# Patient Record
Sex: FEMALE | Race: WHITE | Employment: FULL TIME | URBAN - METROPOLITAN AREA
[De-identification: names, ages, dates, MRNs, and addresses within clinical notes are randomized per-mention and may not be internally consistent; named-entity substitution may affect disease eponyms.]

---

## 2017-01-05 ENCOUNTER — OFFICE VISIT (OUTPATIENT)
Dept: OBGYN | Facility: OTHER | Age: 22
End: 2017-01-05
Payer: COMMERCIAL

## 2017-01-05 VITALS
SYSTOLIC BLOOD PRESSURE: 118 MMHG | WEIGHT: 141.25 LBS | BODY MASS INDEX: 23.25 KG/M2 | DIASTOLIC BLOOD PRESSURE: 68 MMHG | HEART RATE: 68 BPM

## 2017-01-05 DIAGNOSIS — R10.31 ABDOMINAL PAIN, RIGHT LOWER QUADRANT: Primary | ICD-10-CM

## 2017-01-05 PROCEDURE — 99203 OFFICE O/P NEW LOW 30 MIN: CPT | Performed by: ADVANCED PRACTICE MIDWIFE

## 2017-01-05 NOTE — PROGRESS NOTES
Mayi Aleman is a 21 year old who presents to the clinic for evaluation/ follow up of suspected ovarian cyst.   Was seen in the ED at  on 12/28 and diagnosed with a probable right ovarian cyst.         Histories reviewed and updated  Past Medical History   Diagnosis Date     Exercise-induced asthma 3/4/2011     Past Surgical History   Procedure Laterality Date     Appendectomy  12/08/06     Laparoscopic      Colonoscopy  1/28/2013     Procedure: COLONOSCOPY;  Colonoscopy;  Surgeon: Jamison Christianson MD;  Location: [facility]     Biopsy of skin lesion       Social History     Social History     Marital Status: Single     Spouse Name: N/A     Number of Children: N/A     Years of Education: N/A     Occupational History     Not on file.     Social History Main Topics     Smoking status: Never Smoker      Smokeless tobacco: Never Used      Comment: Mom smokes outside     Alcohol Use: No     Drug Use: No     Sexual Activity:     Partners: Male     Birth Control/ Protection: Pill     Other Topics Concern     Parent/Sibling W/ Cabg, Mi Or Angioplasty Before 65f 55m? No     Social History Narrative       Pt is very sad looking, I wonder if this is related to mom who during the whole visit was very gruff and had a     passive aggressive nature.  Mom works as a nurse manager for [email or web address] in [facility].     Family History   Problem Relation Age of Onset     Hypertension Maternal Grandmother      Lipids Maternal Grandmother      CANCER Paternal Grandfather      skin     Asthma Mother      Asthma Maternal Grandmother                   CONSTITUTIONAL: Healthy, alert, in no apparent distress.  GI: NEGATIVE  : rates pain now as 1/10 and notes it primarily after working a shift at [employer].   About a week before the episode of pain noted a blood clot in her urine with out repeating since.   Was just past her pill free week of her extended cycle pills when she was in the ED      EXAM:  /68 mmHg  Pulse 68  Wt 141 lb 4 oz  (64.071 kg)    GI: NEGATIVE/ mild discomfort with palpation of the right lower quad. Otherwise abd soft without masses.   Ultrasound from [facility] from 12/28 normal with the exception of a small amount of free fluid in the pelvis.     ASSESSMENT/PLAN:    (R10.31) Abdominal pain, right lower quadrant  (primary encounter diagnosis)  Comment:   Plan:     Probable ruptured ovarian cyst.    Would recommend continued MAURILIO use.  Could shorted the pill free week from 7 to 4 days.   Could also consider continuous MAURILIO use.   Given the blood clot in he urine couldn't completely exclude renal calculus but there wasn't any blood in her urine on the day of her exam.       Time spent wsa 30 min excluding exam.

## 2017-01-05 NOTE — NURSING NOTE
"Chief Complaint   Patient presents with     Cyst     right side ovarian cyst rupture       Initial /68 mmHg  Pulse 68  Wt 141 lb 4 oz (64.071 kg) Estimated body mass index is 23.25 kg/(m^2) as calculated from the following:    Height as of 3/15/16: 5' 5.35\" (1.66 m).    Weight as of this encounter: 141 lb 4 oz (64.071 kg).  BP completed using cuff size: regular  Margaret Mast CMA      "

## 2017-02-10 ENCOUNTER — OFFICE VISIT (OUTPATIENT)
Dept: FAMILY MEDICINE | Facility: OTHER | Age: 22
End: 2017-02-10
Payer: COMMERCIAL

## 2017-02-10 VITALS
HEIGHT: 65 IN | SYSTOLIC BLOOD PRESSURE: 122 MMHG | DIASTOLIC BLOOD PRESSURE: 78 MMHG | TEMPERATURE: 98.3 F | HEART RATE: 100 BPM | BODY MASS INDEX: 21.83 KG/M2 | RESPIRATION RATE: 12 BRPM | WEIGHT: 131 LBS

## 2017-02-10 DIAGNOSIS — E86.0 DEHYDRATION: ICD-10-CM

## 2017-02-10 DIAGNOSIS — K52.9 GASTROENTERITIS: Primary | ICD-10-CM

## 2017-02-10 PROCEDURE — 99214 OFFICE O/P EST MOD 30 MIN: CPT | Performed by: FAMILY MEDICINE

## 2017-02-10 RX ORDER — ONDANSETRON 4 MG/1
4-8 TABLET, ORALLY DISINTEGRATING ORAL EVERY 8 HOURS PRN
Qty: 20 TABLET | Refills: 1 | COMMUNITY
Start: 2017-02-10 | End: 2019-07-25

## 2017-02-10 ASSESSMENT — PAIN SCALES - GENERAL: PAINLEVEL: NO PAIN (0)

## 2017-02-10 NOTE — PROGRESS NOTES
SUBJECTIVE:                                                    Mayi Aleman is a 21 year old female who presents to clinic today for the following health issues:      Acute Illness   Acute illness concerns: vomiting, diarrhea  Onset: x 3 days     Fever: YES    Chills/Sweats: YES- yesterday    Headache (location?): no    Sinus Pressure:no    Conjunctivitis:  no    Ear Pain: no    Rhinorrhea: no    Congestion: no    Sore Throat: no     Cough: no    Wheeze: no    Decreased Appetite: YES- can't keep anything down    Nausea: YES    Vomiting: YES    Diarrhea:  YES    Dysuria/Freq.: no    Fatigue/Achiness: YES    Sick/Strep Exposure: no     Therapies Tried and outcome: zofran this morning, tylenol  Not able to keep any water down, hasn't eaten in like 3 days besides some gatorade and a couple popsicles   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  Has had nausea and vomiting since then as well.  Thought it was food poisoning.  Started shortly after eating.  Did have a fever.  Hasn't been able to keep much down.  Now just having liquid coming out rectally.  Had at least 4 episodes of emesis in the last 24 hours.  Her diarrhea also smells terrible.    Took some imodium yesterday, noted some temporary improvement.    Denies travel.  No recent antibiotics.  Admitted a patient to the hospital on Monday that had similar symptoms.      Pt has taken Advil and tylenol for this.  Did try some Zofran this morning.       Problem list and histories reviewed & adjusted, as indicated.  Additional history: as documented    Current Outpatient Prescriptions   Medication Sig Dispense Refill     levonorgestrel-ethinyl estradiol (SEASONALE) 0.15-0.03 MG per tablet Take 1 tablet by mouth daily 91 tablet 3     Spacer/Aero-Holding Chambers GRISELDA 1 Device daily. 1 Device prn     albuterol 90 MCG/ACT inhaler Inhale 2 puffs into the lungs  "every 6 hours as needed for shortness of breath / dyspnea. 1 Inhaler 12     No lab results found.   BP Readings from Last 3 Encounters:   02/10/17 122/78   01/05/17 118/68   10/06/16 129/72    Wt Readings from Last 3 Encounters:   02/10/17 131 lb (59.421 kg)   01/05/17 141 lb 4 oz (64.071 kg)   03/15/16 137 lb (62.143 kg)                 ROS:  Constitutional, HEENT, cardiovascular, pulmonary, gi and gu systems are negative, except as otherwise noted.  As above.    OBJECTIVE:                                                    /78 mmHg  Pulse 100  Temp(Src) 98.3  F (36.8  C) (Temporal)  Resp 12  Ht 5' 4.5\" (1.638 m)  Wt 131 lb (59.421 kg)  BMI 22.15 kg/m2  LMP 12/30/2016 (Approximate)  Body mass index is 22.15 kg/(m^2).  GENERAL: healthy, alert and no distress  EYES: Eyes grossly normal to inspection, PERRL and conjunctivae and sclerae normal  HENT: ear canals and TM's normal, nose and mouth without ulcers or lesions  NECK: no adenopathy, no asymmetry, masses, or scars and thyroid normal to palpation  RESP: lungs clear to auscultation - no rales, rhonchi or wheezes  CV: regular rate and rhythm, normal S1 S2, no S3 or S4, no murmur, click or rub, no peripheral edema and peripheral pulses strong  ABDOMEN: bloated, mildly uncomfortable.  No masses.  MS: no gross musculoskeletal defects noted, no edema  SKIN: cap refill under 3 seconds.  Cool extremities.    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    Electronically signed out by:    Zechariah Nolan M.D., [facility]    CLINICAL HISTORY:  The patient is a 21-year-old female.    GROSS:  The specimen is received in formalin with proper patient identification,  labeled \"right posterior shoulder\".  The specimen consists of unoriented  gray-tan ellipsoid skin excision that measures 1.5 x 0.8 cm excised to a  depth of 0.6 cm.  Identified on the skin surface is a pale gray sl ightly  indurated macule that measures 0.6 cm in diameter.  Margin is inked and  the specimen serially sectioned.  Additionally received within the  specimen container is a lobulated gray-yellow soft tissue fragment that  measures 0.8 x 0.5 x 0.2 cm.  This fragment is inked blue and is  bisected.  Entirely submitted as follows: 1-polar ends, 2 and  3-remaining serial sections. (Dictated by: Fernando Urrutia 10/6/2016 02:45  PM)    MICROSCOPIC:  The specimen exhibits irregular epidermal hyperplasia overlying dermal  fibrosis and dense perivascular lymphocytic inflammation consistent with  scar from the prior surgical procedure.  Overlying and adjacent to the  scar, a residual junctional melanocytic proliferation is present, with  both nests and single cells, with moderate cytologic atypia and  architectural disorder but no notable pagetoid scatter or junctional  confluence.  This residual nevus is completely excised.    CPT Codes:  A: 23463-GR3.P, 16282-MD8.T    TESTING LAB LOCATION:  [facility], [address]   [address] [phone]    COLLECTION SITE:  Client: [facility]  Location: [facility] (B)          ASSESSMENT/PLAN:                                                        ICD-10-CM    1. Gastroenteritis K52.9 " Enteric Bacteria and Virus Panel by MESSI Stool     ondansetron (ZOFRAN ODT) 4 MG ODT tab   2. Dehydration E86.0      Pt likely has viral gastroenteritis, but would expect it to be improving by this point.  Discussed IV vs PO rehydration.  Pt wanted to pursue IV fluids.  These were given and pt reported improvement.  See RN documentation.  Will have her continue Zofran until no longer having nausea.  Will obtain stool studies to look for other causes, but history and exam don't indicate any severe etiologies at this time.  Follow up if not improving or if other concerning symptoms occur.          Patient Instructions   Thank you for visiting [facility]    Take the Zofran regularly to allow you to stay hydrated.    Continue tylenol or ibuprofen for comfort and fever control.    Get stool sample in unless you don't poop, which is OK too.    We'll let you know your lab results as soon as we can.     Please see me in 1-2  weeks for follow up if not improving.       If you had imaging scheduled please refer to your radiology prep sheet.    Appointment    Date_______________     Time_____________    Day:   M TU W TH F    With____________________________    Location_________________________    If you need medication refills, please contact your pharmacy 3 days before your prescriptions runs out. If you are out of refills, your pharmacy will contact contact the clinic.    Contact us or return if questions or concerns.     -Your Care Team:  MD Elaine Almazan PA-C Folake Falaki, MD Anoshirvan Mazhari, MD Kelly White, CNP    General information about your clinic      Clinic hours:     Lab hours:  Phone [phone]  Monday 7:30 am-7 pm    Monday 8:30 am-6:30 pm  Tuesday-Friday 7:30 am-5 pm   Tuesday-Friday 8:30 am-4:30 pm    Pharmacy hours:  Phone [phone]  Monday 8:30 am-7pm  Tuesday-Friday 8:30am-6 pm                                       Mychart assistance  [phone]        We would like to hear from you, how was your visit today?    Ally Molina  Patient Information Supervisor   Patient Care Supervisor  [facility], and [facility], [place], and [facility]  [phone]           Zechariah Canada MD, MD  [facility]

## 2017-02-10 NOTE — PROGRESS NOTES
Mayi Aleman is a 21 year old female-   Verbal order per Dr. Canada to place an IV and start fluids.   20 G IV placed to left forearm without difficulty  1L NS started at 0855   Lot # [id number]  0915 325 mL of fluid in.  Site assessment: No evidence of leaking, redness, tenderness, swelling, increased warmth or coolness at site or surrounding tissue.  0930 300 mL fluid in .  Site assessment: No evidence of leaking, redness, tenderness, swelling, increased warmth or coolness at site or surrounding tissue.  0940 200 mL fluid in.  Site assessment: No evidence of leaking, redness, tenderness, swelling, increased warmth or coolness at site or surrounding tissue.  Patient is requesting the fluid be discontinued. She is feeling better and would like to go home.   Discontinuation of intravenous access  Reason for discontinuation: Patient requesting to stop.   Intravenous access discontinued on 2/10/2017 at 0946 per Dr. Canada. Catheter intact. Removal well-tolerated by patient.   Site assessment: No evidence of leaking, redness, tenderness, swelling, increased warmth or coolness at site or surrounding tissue.    Elaine Chaudhary RN

## 2017-02-10 NOTE — MR AVS SNAPSHOT
After Visit Summary   2/10/2017    Mayi Aleman    MRN: [id number]           Patient Information     Date Of Birth          1995        Visit Information        Provider Department      2/10/2017 8:00 AM Zechariah Canada MD [facility]        Today's Diagnoses     Gastroenteritis    -  1       Care Instructions    Thank you for visiting [facility]    Take the Zofran regularly to allow you to stay hydrated.    Continue tylenol or ibuprofen for comfort and fever control.    Get stool sample in unless you don't poop, which is OK too.    We'll let you know your lab results as soon as we can.     Please see me in 1-2  weeks for follow up if not improving.       If you had imaging scheduled please refer to your radiology prep sheet.    Appointment    Date_______________     Time_____________    Day:   M TU W TH F    With____________________________    Location_________________________    If you need medication refills, please contact your pharmacy 3 days before your prescriptions runs out. If you are out of refills, your pharmacy will contact contact the clinic.    Contact us or return if questions or concerns.     -Your Care Team:  MD Elaine Almazan PA-C Folake Falaki, MD Anoshirvan Mazhari, MD Kelly White, CNP    General information about your clinic      Clinic hours:     Lab hours:  Phone [phone]  Monday 7:30 am-7 pm    Monday 8:30 am-6:30 pm  Tuesday-Friday 7:30 am-5 pm   Tuesday-Friday 8:30 am-4:30 pm    Pharmacy hours:  Phone [phone]  Monday 8:30 am-7pm  Tuesday-Friday 8:30am-6 pm                                       Mychart assistance [phone]        We would like to hear from you, how was your visit today?    Ally Molina  Patient Information Supervisor   Patient Care Supervisor  [facility], and [facility], and [facility]  [phone]  [phone]           Follow-ups after your visit        Future tests that were ordered for you today     Open Future Orders        Priority Expected Expires Ordered    Enteric Bacteria and Virus Panel by MESSI Stool Routine  2/10/2018 2/10/2017            Who to contact     If you have questions or need follow up information about today's clinic visit or your schedule please contact [facility] directly at [phone].  Normal or non-critical lab and imaging results will be communicated to you by Worksofthart, letter or phone within 4 business days after the clinic has received the results. If you do not hear from us within 7 days, please contact the clinic through Ad[employer] or phone. If you have a critical or abnormal lab result, we will notify you by phone as soon as possible.  Submit refill requests through Ad[employer] or call your pharmacy and they will forward the refill request to us. Please allow 3 business days for your refill to be completed.          Additional Information About Your Visit        Worksofthar[employer] Information     [employer] gives you secure access to your electronic health record. If you see a primary care provider, you can also send messages to your care team and make appointments. If you have questions, please call your primary care clinic.  If you do not have a primary care provider, please call [phone] and they will assist you.        Care EveryWhere ID     This is your Care EveryWhere ID. This could be used by other organizations to access your [place] medical records  [id number]        Your Vitals Were     Pulse Temperature Respirations Height BMI (Body Mass Index) Last Period    100 98.3  F (36.8  C) (Temporal) 12 5' 4.5\" (1.638 m) 22.15 kg/m2 12/30/2016 (Approximate)       Blood Pressure from Last 3 Encounters:   02/10/17 122/78   01/05/17 118/68   10/06/16 129/72    Weight from Last 3 Encounters:   02/10/17 131 lb (59.421 kg)   01/05/17 141 lb 4 oz (64.071 kg) "   03/15/16 137 lb (62.143 kg)               Primary Care Provider    None       No address on file        Thank you!     Thank you for choosing [facility]  for your care. Our goal is always to provide you with excellent care. Hearing back from our patients is one way we can continue to improve our services. Please take a few minutes to complete the written survey that you may receive in the mail after your visit with us. Thank you!             Your Updated Medication List - Protect others around you: Learn how to safely use, store and throw away your medicines at www.disposemymeds.org.          This list is accurate as of: 2/10/17  9:50 AM.  Always use your most recent med list.                   Brand Name Dispense Instructions for use    albuterol 90 MCG/ACT inhaler     1 Inhaler    Inhale 2 puffs into the lungs every 6 hours as needed for shortness of breath / dyspnea.       levonorgestrel-ethinyl estradiol 0.15-0.03 MG per tablet    SEASONALE    91 tablet    Take 1 tablet by mouth daily       spacer/aero-hold chamber mask Seema     1 Device    1 Device daily.       ZOFRAN ODT 4 MG ODT tab   Generic drug:  ondansetron     20 tablet    Take 1-2 tablets (4-8 mg) by mouth every 8 hours as needed for nausea

## 2017-02-10 NOTE — NURSING NOTE
"Chief Complaint   Patient presents with     Vomiting     Diarrhea     Panel Management     Flu shot, Chlamydia, act       Initial /78 mmHg  Pulse 100  Temp(Src) 98.3  F (36.8  C) (Temporal)  Resp 12  Ht 5' 4.5\" (1.638 m)  Wt 131 lb (59.421 kg)  BMI 22.15 kg/m2  LMP 12/30/2016 (Approximate) Estimated body mass index is 22.15 kg/(m^2) as calculated from the following:    Height as of this encounter: 5' 4.5\" (1.638 m).    Weight as of this encounter: 131 lb (59.421 kg).  Medication Reconciliation: complete  Marek López, PAIGE    "

## 2017-02-10 NOTE — Clinical Note
[facility]  [address] [address]  Phone: [phone]    February 10, 2017        Mayi Aleman  [address] [address] [address]          To whom it may concern:    RE: Mayi Aleman    Patient was seen and treated today at our clinic and missed work.  Patient may return to work only after her current condition resolves (in the interest of protecting patients from her illness).    Please contact me for questions or concerns.      Sincerely,        Zechariah Canada MD

## 2017-02-11 ASSESSMENT — ASTHMA QUESTIONNAIRES: ACT_TOTALSCORE: 25

## 2017-03-20 ENCOUNTER — TELEPHONE (OUTPATIENT)
Dept: FAMILY MEDICINE | Facility: OTHER | Age: 22
End: 2017-03-20

## 2017-03-20 DIAGNOSIS — Z30.41 ENCOUNTER FOR SURVEILLANCE OF CONTRACEPTIVE PILLS: ICD-10-CM

## 2017-03-20 RX ORDER — LEVONORGESTREL / ETHINYL ESTRADIOL 0.15-0.03
KIT ORAL
Qty: 91 TABLET | Refills: 0 | Status: CANCELLED | OUTPATIENT
Start: 2017-03-20

## 2017-03-20 RX ORDER — LEVONORGESTREL AND ETHINYL ESTRADIOL 0.15-0.03
1 KIT ORAL DAILY
Qty: 91 TABLET | Refills: 3 | Status: CANCELLED | OUTPATIENT
Start: 2017-03-20

## 2017-03-20 NOTE — TELEPHONE ENCOUNTER
Seasonale       Last Written Prescription Date: 3/15/16  Last Fill Quantity: 91,  # refills: 3   Last Office Visit with [facility], [facility] or [facility] prescribing provider: 2/10/17

## 2017-03-20 NOTE — TELEPHONE ENCOUNTER
Lenin    - Birth control on med list is seasonale  Last Written Prescription Date: 3/15/16  Last Fill Quantity: 91,  # refills: 3   Last Office Visit with [facility], [facility] or [facility] prescribing provider: 2/10/17

## 2017-03-20 NOTE — TELEPHONE ENCOUNTER
[facility] phone call message- patient requests medication or medication refill:    If this is a refill request, has the caller requested the refill from the pharmacy already? No  Name of the pharmacy and phone number for the current request:  [facility]    Name of the medication requested: Lenin    Other request: I did tell patient to call pharmacy first next time    OK to leave the result message on voice mail or with a family member? NO    Call taken on 3/20/2017 at 4:24 PM by Mimi Hyot

## 2017-03-21 ENCOUNTER — OFFICE VISIT (OUTPATIENT)
Dept: FAMILY MEDICINE | Facility: OTHER | Age: 22
End: 2017-03-21
Payer: COMMERCIAL

## 2017-03-21 VITALS
HEIGHT: 64 IN | DIASTOLIC BLOOD PRESSURE: 72 MMHG | TEMPERATURE: 98.2 F | RESPIRATION RATE: 16 BRPM | HEART RATE: 74 BPM | SYSTOLIC BLOOD PRESSURE: 112 MMHG | WEIGHT: 132.8 LBS | BODY MASS INDEX: 22.67 KG/M2 | OXYGEN SATURATION: 98 %

## 2017-03-21 DIAGNOSIS — J45.990 EXERCISE-INDUCED ASTHMA: ICD-10-CM

## 2017-03-21 DIAGNOSIS — Z30.41 ENCOUNTER FOR SURVEILLANCE OF CONTRACEPTIVE PILLS: ICD-10-CM

## 2017-03-21 DIAGNOSIS — Z00.00 ENCOUNTER FOR ROUTINE ADULT HEALTH EXAMINATION WITHOUT ABNORMAL FINDINGS: Primary | ICD-10-CM

## 2017-03-21 DIAGNOSIS — J45.990 ASTHMA, EXERCISE INDUCED: ICD-10-CM

## 2017-03-21 DIAGNOSIS — Z11.3 SCREENING EXAMINATION FOR VENEREAL DISEASE: ICD-10-CM

## 2017-03-21 PROCEDURE — 99395 PREV VISIT EST AGE 18-39: CPT | Performed by: STUDENT IN AN ORGANIZED HEALTH CARE EDUCATION/TRAINING PROGRAM

## 2017-03-21 PROCEDURE — 87591 N.GONORRHOEAE DNA AMP PROB: CPT | Performed by: STUDENT IN AN ORGANIZED HEALTH CARE EDUCATION/TRAINING PROGRAM

## 2017-03-21 PROCEDURE — 87491 CHLMYD TRACH DNA AMP PROBE: CPT | Performed by: STUDENT IN AN ORGANIZED HEALTH CARE EDUCATION/TRAINING PROGRAM

## 2017-03-21 RX ORDER — LEVONORGESTREL AND ETHINYL ESTRADIOL 0.15-0.03
1 KIT ORAL DAILY
Qty: 91 TABLET | Refills: 3 | Status: SHIPPED | OUTPATIENT
Start: 2017-03-21 | End: 2018-02-19

## 2017-03-21 ASSESSMENT — PAIN SCALES - GENERAL: PAINLEVEL: NO PAIN (0)

## 2017-03-21 NOTE — PROGRESS NOTES
SUBJECTIVE:     CC: Mayi Aleman is an 22 year old woman who presents for preventive health visit.     Physical   Annual:     Getting at least 3 servings of Calcium per day::  Yes    Bi-annual eye exam::  NO    Dental care twice a year::  Yes    Sleep apnea or symptoms of sleep apnea::  None    Diet::  Regular (no restrictions)    Frequency of exercise::  2-3 days/week    Duration of exercise::  30-45 minutes    Taking medications regularly::  Yes    Medication side effects::  Not applicable    Additional concerns today::  No    LMP: 12/30    ASSESSMENT:  abdominal CT scan  ACT Total Scores 3/27/2015 3/15/2016 2/10/2017   ACT TOTAL SCORE 29 - -   ASTHMA ER VISITS 0 = None - -   ASTHMA HOSPITALIZATIONS 0 = None - -   ACT TOTAL SCORE (Goal Greater than or Equal to 20) - 25 25   In the past 12 months, how many times did you visit the emergency room for your asthma without being admitted to the hospital? - 0 0   In the past 12 months, how many times were you hospitalized overnight because of your asthma? - 0 0       Today's PHQ-2 Score:   PHQ-2 ( 1999 [employer]) 3/21/2017   Q1: Little interest or pleasure in doing things -   Q2: Feeling down, depressed or hopeless -   PHQ-2 Score -   Little interest or pleasure in doing things Not at all   Feeling down, depressed or hopeless Not at all   PHQ-2 Score 0       Abuse: Current or Past(Physical, Sexual or Emotional)- No  Do you feel safe in your environment - Yes    Social History   Substance Use Topics     Smoking status: Never Smoker     Smokeless tobacco: Never Used      Comment: Mom smokes outside     Alcohol use No     The patient does not drink >3 drinks per day nor >7 drinks per week.    Recent Labs   Lab Test  03/19/09   1633   CHOL  140       Reviewed orders with patient.  Reviewed health maintenance and updated orders accordingly - Yes    Mammo Decision Support:  Mammogram not appropriate for this patient based on age.    Pertinent mammograms are reviewed  under the imaging tab.  History of abnormal Pap smear: NO - age 21-29 PAP every 3 years recommended    Reviewed and updated as needed this visit by clinical staff  Allergies         Reviewed and updated as needed this visit by Provider        Past Medical History   Diagnosis Date     Exercise-induced asthma 3/4/2011      Past Surgical History   Procedure Laterality Date     Appendectomy  12/08/06     Laparoscopic      Colonoscopy  1/28/2013     Procedure: COLONOSCOPY;  Colonoscopy;  Surgeon: Jamison Christianson MD;  Location: [facility]     Biopsy of skin lesion       Obstetric History     No data available          ROS:  C: NEGATIVE for fever, chills, change in weight  I: NEGATIVE for worrisome rashes, moles or lesions  E: NEGATIVE for vision changes or irritation  ENT: NEGATIVE for ear, mouth and throat problems  R: NEGATIVE for significant cough or SOB  CV: NEGATIVE for chest pain, palpitations or peripheral edema  GI: NEGATIVE for nausea, abdominal pain, heartburn, or change in bowel habits  : NEGATIVE for unusual urinary or vaginal symptoms. Periods are regular.  M: NEGATIVE for significant arthralgias or myalgia  N: NEGATIVE for weakness, dizziness or paresthesias  P: NEGATIVE for changes in mood or affect    Problem list, Medication list, Allergies, and Medical/Social/Surgical histories reviewed in [facility] and updated as appropriate.  Labs reviewed in EPIC  OBJECTIVE:     There were no vitals taken for this visit.  EXAM:  GENERAL: healthy, alert and no distress  EYES: Eyes grossly normal to inspection, PERRL and conjunctivae and sclerae normal  HENT: ear canals and TM's normal, nose and mouth without ulcers or lesions  NECK: no adenopathy, no asymmetry, masses, or scars and thyroid normal to palpation  RESP: lungs clear to auscultation - no rales, rhonchi or wheezes  CV: regular rate and rhythm, normal S1 S2, no S3 or S4, no murmur, click or rub, no peripheral edema and peripheral pulses strong  ABDOMEN: soft,  "nontender, no hepatosplenomegaly, no masses and bowel sounds normal  MS: no gross musculoskeletal defects noted, no edema  SKIN: no suspicious lesions or rashes  NEURO: Normal strength and tone, mentation intact and speech normal  PSYCH: mentation appears normal, affect normal/bright    ASSESSMENT/PLAN:     1. Encounter for routine adult health examination without abnormal findings    2. Encounter for surveillance of contraceptive pills  Taking 3 months at a time.  Periods normal flow.  No side effects of pills  - levonorgestrel-ethinyl estradiol (SEASONALE) 0.15-0.03 MG per tablet; Take 1 tablet by mouth daily  Dispense: 91 tablet; Refill: 3    3. Exercise-induced asthma  Well controlled. No changes.  Has albuterol inhaler at home.    4. Screening examination for venereal disease  - NEISSERIA GONORRHOEA PCR  - CHLAMYDIA TRACHOMATIS PCR    COUNSELING:  Reviewed preventive health counseling, as reflected in patient instructions       Regular exercise       Healthy diet/nutrition       Safe sex practices/STD prevention         reports that she has never smoked. She has never used smokeless tobacco.    Estimated body mass index is 22.14 kg/(m^2) as calculated from the following:    Height as of 2/10/17: 5' 4.5\" (1.638 m).    Weight as of 2/10/17: 131 lb (59.4 kg).       Counseling Resources:  ATP IV Guidelines  Pooled Cohorts Equation Calculator  Breast Cancer Risk Calculator  FRAX Risk Assessment  ICSI Preventive Guidelines  Dietary Guidelines for Americans, 2010  USDA's MyPlate  ASA Prophylaxis  Lung CA Screening    Katie Jean Baptiste, MEI [facility]"

## 2017-03-21 NOTE — NURSING NOTE
"Chief Complaint   Patient presents with     Physical       Initial /72 (BP Location: Right arm, Patient Position: Chair, Cuff Size: Adult Regular)  Pulse 74  Temp 98.2  F (36.8  C) (Temporal)  Resp 16  Ht 5' 4.49\" (1.638 m)  Wt 132 lb 12.8 oz (60.2 kg)  LMP 12/21/2016 (Approximate)  SpO2 98%  BMI 22.45 kg/m2 Estimated body mass index is 22.45 kg/(m^2) as calculated from the following:    Height as of this encounter: 5' 4.49\" (1.638 m).    Weight as of this encounter: 132 lb 12.8 oz (60.2 kg).  Medication Reconciliation: complete   Irais Rosen CMA      "

## 2017-03-21 NOTE — MR AVS SNAPSHOT
After Visit Summary   3/21/2017    Mayi Aleman    MRN: [id number]           Patient Information     Date Of Birth          1995        Visit Information        Provider Department      3/21/2017 11:20 AM Katie Jean Baptiste APRN [facility]        Today's Diagnoses     Encounter for routine adult health examination without abnormal findings    -  1    Encounter for surveillance of contraceptive pills        Exercise-induced asthma        Screening examination for venereal disease        Asthma, exercise induced          Care Instructions      Preventive Health Recommendations  Female Ages 18 to 25     Yearly exam:     See your health care provider every year in order to  o Review health changes.   o Discuss preventive care.    o Review your medicines if your doctor has prescribed any.      You should be tested each year for STDs (sexually transmitted diseases).       After age 20, talk to your provider about how often you should have cholesterol testing.      Starting at age 21, get a Pap test every three years. If you have an abnormal result, your doctor may have you test more often.      If you are at risk for diabetes, you should have a diabetes test (fasting glucose).     Shots:     Get a flu shot each year.     Get a tetanus shot every 10 years.     Consider getting the shot (vaccine) that prevents cervical cancer (Gardasil).    Nutrition:     Eat at least 5 servings of fruits and vegetables each day.    Eat whole-grain bread, whole-wheat pasta and brown rice instead of white grains and rice.    Talk to your provider about Calcium and Vitamin D.     Lifestyle    Exercise at least 150 minutes a week each week (30 minutes a day, 5 days a week). This will help you control your weight and prevent disease.    Limit alcohol to one drink per day.    No smoking.     Wear sunscreen to prevent skin cancer.    See your dentist every six months for an exam and  "cleaning.        Follow-ups after your visit        Who to contact     If you have questions or need follow up information about today's clinic visit or your schedule please contact [facility] directly at [phone].  Normal or non-critical lab and imaging results will be communicated to you by MyChart, letter or phone within 4 business days after the clinic has received the results. If you do not hear from us within 7 days, please contact the clinic through MyChart or phone. If you have a critical or abnormal lab result, we will notify you by phone as soon as possible.  Submit refill requests through Reddit or call your pharmacy and they will forward the refill request to us. Please allow 3 business days for your refill to be completed.          Additional Information About Your Visit        MyChart Information     Reddit gives you secure access to your electronic health record. If you see a primary care provider, you can also send messages to your care team and make appointments. If you have questions, please call your primary care clinic.  If you do not have a primary care provider, please call [phone] and they will assist you.        Care EveryWhere ID     This is your Care EveryWhere ID. This could be used by other organizations to access your [place] medical records  [id number]        Your Vitals Were     Pulse Temperature Respirations Height Last Period Pulse Oximetry    74 98.2  F (36.8  C) (Temporal) 16 5' 4.49\" (1.638 m) 12/21/2016 (Approximate) 98%    BMI (Body Mass Index)                   22.45 kg/m2            Blood Pressure from Last 3 Encounters:   03/21/17 112/72   02/10/17 122/78   01/05/17 118/68    Weight from Last 3 Encounters:   03/21/17 132 lb 12.8 oz (60.2 kg)   02/10/17 131 lb (59.4 kg)   01/05/17 141 lb 4 oz (64.1 kg)              We Performed the Following     Asthma Action Plan (AAP)     CHLAMYDIA TRACHOMATIS PCR     NEISSERIA GONORRHOEA PCR          Where to " get your medicines      These medications were sent to [facility] [place] - [place] - [address]  [address], [facility] [address]     Phone:  [phone]     levonorgestrel-ethinyl estradiol 0.15-0.03 MG per tablet          Primary Care Provider    None       No address on file        Thank you!     Thank you for choosing [facility]  for your care. Our goal is always to provide you with excellent care. Hearing back from our patients is one way we can continue to improve our services. Please take a few minutes to complete the written survey that you may receive in the mail after your visit with us. Thank you!             Your Updated Medication List - Protect others around you: Learn how to safely use, store and throw away your medicines at www.disposemymeds.org.          This list is accurate as of: 3/21/17 12:29 PM.  Always use your most recent med list.                   Brand Name Dispense Instructions for use    albuterol 90 MCG/ACT inhaler     1 Inhaler    Inhale 2 puffs into the lungs every 6 hours as needed for shortness of breath / dyspnea.       levonorgestrel-ethinyl estradiol 0.15-0.03 MG per tablet    SEASONALE    91 tablet    Take 1 tablet by mouth daily       spacer/aero-hold chamber mask Seema     1 Device    1 Device daily.       ZOFRAN ODT 4 MG ODT tab   Generic drug:  ondansetron     20 tablet    Take 4-8 mg by mouth every 8 hours as needed for nausea Reported on 3/21/2017

## 2017-03-22 ASSESSMENT — ASTHMA QUESTIONNAIRES: ACT_TOTALSCORE: 25

## 2017-03-23 LAB
C TRACH DNA SPEC QL NAA+PROBE: NORMAL
N GONORRHOEA DNA SPEC QL NAA+PROBE: NORMAL
SPECIMEN SOURCE: NORMAL
SPECIMEN SOURCE: NORMAL

## 2017-03-24 DIAGNOSIS — Z30.41 ENCOUNTER FOR SURVEILLANCE OF CONTRACEPTIVE PILLS: ICD-10-CM

## 2017-03-24 RX ORDER — LEVONORGESTREL / ETHINYL ESTRADIOL 0.15-0.03
KIT ORAL
Qty: 91 TABLET | Refills: 0 | OUTPATIENT
Start: 2017-03-24

## 2017-03-24 NOTE — TELEPHONE ENCOUNTER
birthcontrol      Last Written Prescription Date: 03/21/17  Last Fill Quantity: 91,  # refills: 3   Last Office Visit with [facility], [facility] or [facility] prescribing provider:     duplicate request- info sent to pharmacy.  Kim Rueda RN  [facility]  [phone]

## 2017-06-15 ENCOUNTER — OFFICE VISIT (OUTPATIENT)
Dept: DERMATOLOGY | Facility: CLINIC | Age: 22
End: 2017-06-15
Payer: COMMERCIAL

## 2017-06-15 DIAGNOSIS — D48.5 NEOPLASM OF UNCERTAIN BEHAVIOR OF SKIN: ICD-10-CM

## 2017-06-15 DIAGNOSIS — Z86.018 HISTORY OF DYSPLASTIC NEVUS: ICD-10-CM

## 2017-06-15 DIAGNOSIS — L91.0 HYPERTROPHIC SCAR: Primary | ICD-10-CM

## 2017-06-15 PROCEDURE — 11900 INJECT SKIN LESIONS </W 7: CPT | Performed by: DERMATOLOGY

## 2017-06-15 PROCEDURE — 88305 TISSUE EXAM BY PATHOLOGIST: CPT | Performed by: DERMATOLOGY

## 2017-06-15 PROCEDURE — 11100 HC BIOPSY SKIN/SUBQ/MUC MEM, SINGLE LESION: CPT | Performed by: DERMATOLOGY

## 2017-06-15 PROCEDURE — 11101 HC BIOPSY SKIN/SUBQ/MUC MEM, EACH ADDTL LESION: CPT | Performed by: DERMATOLOGY

## 2017-06-15 PROCEDURE — 99213 OFFICE O/P EST LOW 20 MIN: CPT | Mod: 25 | Performed by: DERMATOLOGY

## 2017-06-15 ASSESSMENT — PAIN SCALES - GENERAL: PAINLEVEL: NO PAIN (0)

## 2017-06-15 NOTE — PATIENT INSTRUCTIONS
It was a pleasure to see you at your recent visit in Dermatology.    We will schedule your next follow up appointment however, your appointment may be rescheduled due to any unforseen circumstances.    If you have any questions or concerns, please feel free to contact us at [facility] at [phone].        Wound Care After a Biopsy    What is a skin biopsy?  A skin biopsy allows the doctor to examine a very small piece of tissue under the microscope to determine the diagnosis and the best treatment for the skin condition. A local anesthetic (numbing medicine)  is injected with a very small needle into the skin area to be tested. A small piece of skin is taken from the area. Sometimes a suture (stitch) is used.     What are the risks of a skin biopsy?  I will experience scar, bleeding, swelling, pain, crusting and redness. I may experience incomplete removal or recurrence. Risks of this procedure are excessive bleeding, bruising, infection, nerve damage, numbness, thick (hypertrophic or keloidal) scar and non-diagnostic biopsy.    How should I care for my wound for the first 24 hours?    Keep the wound dry and covered for 24 hours    If it bleeds, hold direct pressure on the area for 15 minutes. If bleeding does not stop then go to the emergency room    Avoid strenuous exercise the first 1-2 days or as your doctor instructs you    How should I care for the wound after 24 hours?    After 24 hours, remove the bandage    You may bathe or shower as normal    If you had a scalp biopsy, you can shampoo as usual and can use shower water to clean the biopsy site daily    Clean the wound twice a day with gentle soap and water    Do not scrub, be gentle    Apply white petroleum/Vaseline after cleaning the wound with a cotton swab or a clean finger, and keep the site covered with a Bandaid /bandage. Bandages are not necessary with a scalp biopsy    If you are unable to cover the site with a  Bandaid /bandage, re-apply ointment 2-3 times a day to keep the site moist. Moisture will help with healing    Avoid strenuous activity for first 1-2 days    Avoid lakes, rivers, pools, and oceans until the stitches are removed or the site is healed    How do I clean my wound?    Wash hands thoroughly with soap or use hand  before all wound care    Clean the wound with gentle soap and water    Apply white petroleum/Vaseline  to wound after it is clean    Replace the Bandaid /bandage to keep the wound covered for the first few days or as instructed by your doctor    If you had a scalp biopsy, warm shower water to the area on a daily basis should suffice    What should I use to clean my wound?     Cotton-tipped applicators (Qtips )    White petroleum jelly (Vaseline ). Use a clean new container and use Q-tips to apply.    Bandaids   as needed    Gentle soap     How should I care for my wound long term?    Do not get your wound dirty    Keep up with wound care for one week or until the area is healed.    A small scab will form and fall off by itself when the area is completely healed. The area will be red and will become pink in color as it heals. Sun protection is very important for how your scar will turn out. Sunscreen with an SPF 30 or greater is recommended once the area is healed.    If you have stitches, stitches need to be removed in 11-14 days. You may return to our clinic for this or you may have it done locally at your doctor s office.    You should have some soreness but it should be mild and slowly go away over several days. Talk to your doctor about using tylenol for pain,    When should I call my doctor?  If you have increased:     Pain or swelling    Pus or drainage (clear or slightly yellow drainage is ok)    Temperature over 100F    Spreading redness or warmth around wound    When will I hear about my results?  The biopsy results can take 2-3 weeks to come back. The clinic will call you with  the results, send you a Speedment message, or have you schedule a follow-up clinic or phone time to discuss the results. Contact our clinics if you do not hear from us in 3 weeks.     Who should I call with questions?    [facility]: [phone]     [facility]: [phone]    For urgent needs outside of business hours call the [facility] at [phone] and ask for the dermatology resident on call

## 2017-06-15 NOTE — MR AVS SNAPSHOT
After Visit Summary   6/15/2017    Mayi Aleman    MRN: [id number]           Patient Information     Date Of Birth          1995        Visit Information        Provider Department      6/15/2017 11:00 AM Carin Melendez MD [facility]        Today's Diagnoses     Hypertrophic scar    -  1    History of dysplastic nevus        Neoplasm of uncertain behavior of skin          Care Instructions    It was a pleasure to see you at your recent visit in Dermatology.    We will schedule your next follow up appointment however, your appointment may be rescheduled due to any unforseen circumstances.    If you have any questions or concerns, please feel free to contact us at [facility] at [phone].        Wound Care After a Biopsy    What is a skin biopsy?  A skin biopsy allows the doctor to examine a very small piece of tissue under the microscope to determine the diagnosis and the best treatment for the skin condition. A local anesthetic (numbing medicine)  is injected with a very small needle into the skin area to be tested. A small piece of skin is taken from the area. Sometimes a suture (stitch) is used.     What are the risks of a skin biopsy?  I will experience scar, bleeding, swelling, pain, crusting and redness. I may experience incomplete removal or recurrence. Risks of this procedure are excessive bleeding, bruising, infection, nerve damage, numbness, thick (hypertrophic or keloidal) scar and non-diagnostic biopsy.    How should I care for my wound for the first 24 hours?    Keep the wound dry and covered for 24 hours    If it bleeds, hold direct pressure on the area for 15 minutes. If bleeding does not stop then go to the emergency room    Avoid strenuous exercise the first 1-2 days or as your doctor instructs you    How should I care for the wound after 24 hours?    After 24 hours, remove the bandage    You may bathe or shower as  normal    If you had a scalp biopsy, you can shampoo as usual and can use shower water to clean the biopsy site daily    Clean the wound twice a day with gentle soap and water    Do not scrub, be gentle    Apply white petroleum/Vaseline after cleaning the wound with a cotton swab or a clean finger, and keep the site covered with a Bandaid /bandage. Bandages are not necessary with a scalp biopsy    If you are unable to cover the site with a Bandaid /bandage, re-apply ointment 2-3 times a day to keep the site moist. Moisture will help with healing    Avoid strenuous activity for first 1-2 days    Avoid lakes, rivers, pools, and oceans until the stitches are removed or the site is healed    How do I clean my wound?    Wash hands thoroughly with soap or use hand  before all wound care    Clean the wound with gentle soap and water    Apply white petroleum/Vaseline  to wound after it is clean    Replace the Bandaid /bandage to keep the wound covered for the first few days or as instructed by your doctor    If you had a scalp biopsy, warm shower water to the area on a daily basis should suffice    What should I use to clean my wound?     Cotton-tipped applicators (Qtips )    White petroleum jelly (Vaseline ). Use a clean new container and use Q-tips to apply.    Bandaids   as needed    Gentle soap     How should I care for my wound long term?    Do not get your wound dirty    Keep up with wound care for one week or until the area is healed.    A small scab will form and fall off by itself when the area is completely healed. The area will be red and will become pink in color as it heals. Sun protection is very important for how your scar will turn out. Sunscreen with an SPF 30 or greater is recommended once the area is healed.    If you have stitches, stitches need to be removed in 11-14 days. You may return to our clinic for this or you may have it done locally at your doctor s office.    You should have some  soreness but it should be mild and slowly go away over several days. Talk to your doctor about using tylenol for pain,    When should I call my doctor?  If you have increased:     Pain or swelling    Pus or drainage (clear or slightly yellow drainage is ok)    Temperature over 100F    Spreading redness or warmth around wound    When will I hear about my results?  The biopsy results can take 2-3 weeks to come back. The clinic will call you with the results, send you a mcTELt message, or have you schedule a follow-up clinic or phone time to discuss the results. Contact our clinics if you do not hear from us in 3 weeks.     Who should I call with questions?    [facility]: [phone]     [facility]: [phone]    For urgent needs outside of business hours call the [facility] at [phone] and ask for the dermatology resident on call              Follow-ups after your visit        Who to contact     If you have questions or need follow up information about today's clinic visit or your schedule please contact [facility] directly at [phone].  Normal or non-critical lab and imaging results will be communicated to you by MyChart, letter or phone within 4 business days after the clinic has received the results. If you do not hear from us within 7 days, please contact the clinic through [employer] or phone. If you have a critical or abnormal lab result, we will notify you by phone as soon as possible.  Submit refill requests through [employer] or call your pharmacy and they will forward the refill request to us. Please allow 3 business days for your refill to be completed.          Additional Information About Your Visit        [employer] Information     [employer] gives you secure access to your electronic health record. If you see a primary care provider, you can also send messages to your care team and make appointments. If you have  questions, please call your primary care clinic.  If you do not have a primary care provider, please call [phone] and they will assist you.      [employer] is an electronic gateway that provides easy, online access to your medical records. With [employer], you can request a clinic appointment, read your test results, renew a prescription or communicate with your care team.     To access your existing account, please contact your [facility] or call [phone] for assistance.        Care EveryWhere ID     This is your Care EveryWhere ID. This could be used by other organizations to access your [place] medical records  [id number]         Blood Pressure from Last 3 Encounters:   03/21/17 112/72   02/10/17 122/78   01/05/17 118/68    Weight from Last 3 Encounters:   03/21/17 132 lb 12.8 oz (60.2 kg)   02/10/17 131 lb (59.4 kg)   01/05/17 141 lb 4 oz (64.1 kg)              We Performed the Following     BIOPSY SKIN/SUBQ/MUC MEM, EACH ADDTL LESION     BIOPSY SKIN/SUBQ/MUC MEM, SINGLE LESION     INJECTION INTO SKIN LESIONS <=7     Surgical pathology exam        Primary Care Provider    None       No address on file        Thank you!     Thank you for choosing [facility]  for your care. Our goal is always to provide you with excellent care. Hearing back from our patients is one way we can continue to improve our services. Please take a few minutes to complete the written survey that you may receive in the mail after your visit with us. Thank you!             Your Updated Medication List - Protect others around you: Learn how to safely use, store and throw away your medicines at www.disposemymeds.org.          This list is accurate as of: 6/15/17 11:54 AM.  Always use your most recent med list.                   Brand Name Dispense Instructions for use    albuterol 90 MCG/ACT inhaler     1 Inhaler    Inhale 2 puffs into the lungs every 6 hours as needed for shortness of  breath / dyspnea.       levonorgestrel-ethinyl estradiol 0.15-0.03 MG per tablet    SEASONALE    91 tablet    Take 1 tablet by mouth daily       spacer/aero-hold chamber mask Seema     1 Device    1 Device daily.       ZOFRAN ODT 4 MG ODT tab   Generic drug:  ondansetron     20 tablet    Take 4-8 mg by mouth every 8 hours as needed for nausea Reported on 3/21/2017

## 2017-06-15 NOTE — PROGRESS NOTES
[facility] Dermatology Note      Dermatology Problem List:  1. Consistent with pigmented spindle cell nevus, right upper back  -s/p excision 8/11/2015  2. History of dysplastic nevi  -Compound dysplastic nevus with moderate to severe atypia, right posterior shoulder, s/p excision 10/6/2016  -Compound dysplastic nevus with moderate to severe atypia, right chest, s/p excision 8/11/2015    Encounter Date: Marcos 15, 2017    CC:  Chief Complaint   Patient presents with     Derm Problem     yearly skin check hx DN          History of Present Illness:  Ms. Mayi Aleman is a 22 year old female who presents as a follow-up for history of spindle cell nevus and dysplastic nevi. The patient was last seen 10/6/2016 by Dr. Frank when a dysplastic nevus on the right posterior shoulder was treated with excision. Today, the patient reports that her prior excision on the right posterior shoulder went well. She would like to discuss scar treatment. The scar is pruritic and she is concerned with aesthetic appearance.     She thinks that lesion on the right groin is unchanged. She has had the lesion for the past few years.     Reports another lesion on her genitals that sticks out and catches. The patient reports no other lesions of concern.     Past Medical History:   Patient Active Problem List   Diagnosis     Contraception     Exercise-induced asthma     Intermittent asthma     Past Medical History:   Diagnosis Date     Exercise-induced asthma 3/4/2011     Past Surgical History:   Procedure Laterality Date     APPENDECTOMY  12/08/06    Laparoscopic      BIOPSY OF SKIN LESION       COLONOSCOPY  1/28/2013    Procedure: COLONOSCOPY;  Colonoscopy;  Surgeon: Jamison Christianson MD;  Location:  GI     Social History:  The patient works as a nurse at [employer]. The patient does not smoke. The patient drinks 1-2 alcoholic beverages per week.     Family History:  There is a family history of possible melanoma skin  cancer, grandfather.  There is a family history of asthma.  There is no family history of hay fever, psoriasis or eczema.     Medications:  Current Outpatient Prescriptions   Medication Sig Dispense Refill     levonorgestrel-ethinyl estradiol (SEASONALE) 0.15-0.03 MG per tablet Take 1 tablet by mouth daily 91 tablet 3     ondansetron (ZOFRAN ODT) 4 MG ODT tab Take 4-8 mg by mouth every 8 hours as needed for nausea Reported on 3/21/2017 20 tablet 1     Spacer/Aero-Holding Chambers GRISELDA 1 Device daily. 1 Device prn     albuterol 90 MCG/ACT inhaler Inhale 2 puffs into the lungs every 6 hours as needed for shortness of breath / dyspnea. 1 Inhaler 12     Allergies   Allergen Reactions     Sulfate      Review of Systems:  -Const: The patient is generally feeling well today.   -Skin: As above in HPI. No additional skin concerns.     Physical exam:  There were no vitals taken for this visit.  GEN: This is a well developed, well-nourished female in no acute distress, in a pleasant mood.    SKIN: Full skin, which includes the head/face, both arms, chest, back, abdomen,both legs, genitalia and/or groin buttocks, digits and/or nails, was examined.  -There are well healed surgical scars without erythema, nodularity or telangiectasias on the right upper back, right posterior shoulder and right chest.   -Irregularly shaped darker pigmented papule on the left buttock.   -Slightly hypertrophic 2cm scar on the right upper back.  -Lesions in groin are WNL.   -Pigmented irregularly shaped 1.1cm patch of the right groin.   -No other lesions of concern on areas examined.     Impression/Plan:  1. History of spindle cell nevus, no clinical evidence of recurrence  Recommend self skin exams every 3 months.   ABCD's of melanoma were reviewed with patient and handout provided.   Recommend sunscreens SPF #30 or greater, protective clothing and avoidance of tanning beds.  2. History of dysplastic nevus, no clinical evidence of recurrence.    3. Hypertrophic 2cm  scar, right upper back. History of pruritus    Plan to send letter to insurance to discuss coverage of CO2RE and PDL.     Kenalog intralesional injection procedure note: After verbal consent and discussion of risks including but not limited to atrophy, pain, and bruising, time out was performed, the patient underwent positioning and the area was prepped with isopropyl alcohol, 0.20 total cc of Kenalog 5 mg/cc was injected into 1 lesion on the right upper back.  The patient tolerated the procedure well and left the Dermatology clinic in good condition.      Recommended scar sheets/silicone  4. Irregularly shaped darker pigmented papule, left buttock. Neoplasm of uncertain behavior. Differential diagnosis includes benign congenital nevus versus dysplastic nevus-patient did not want shave    Punch biopsy:  After discussion of benefits and risks including but not limited to bleeding/bruising, pain/swelling, infection, scar, incomplete removal, nerve damage/numbness, recurrence, and non-diagnostic biopsy, written consent, verbal consent and photographs were obtained. Time-out was performed. The area was cleaned with isopropyl alcohol. 0.5 mL of 1% lidocaine with epinephrine was injected to obtain adequate anesthesia of the lesion on the left buttocks. A 4 mm punch biopsy was performed.  4-0 prolene sutures were utilized to approximate the epidermal edges.  White petroleum jelly/VaselineTM and a bandage was applied to the wound.  Explicit verbal and written wound care instructions were provided.  The patient left the Dermatology Clinic in good condition. The patient was counseled to follow up for suture removal in approximately 11-14 days.  5. Pigmented irregularly shaped 1.1cm patch, right groin. Neoplasm of uncertain behavior. Differential diagnosis includes benign congenital nevus versus dysplastic nevus, changed compared to prior photo. -did not want shave    Punch biopsy:  After discussion of  benefits and risks including but not limited to bleeding/bruising, pain/swelling, infection, scar, incomplete removal, nerve damage/numbness, recurrence, and non-diagnostic biopsy, written consent, verbal consent and photographs were obtained. Time-out was performed. The area was cleaned with isopropyl alcohol. 0.5 mL of 1% lidocaine with epinephrine was injected to obtain adequate anesthesia of the lesion on the right groin. A 4 mm punch biopsy was performed.  4-0 prolene sutures were utilized to approximate the epidermal edges.  White petroleum jelly/VaselineTM and a bandage was applied to the wound.  Explicit verbal and written wound care instructions were provided.  The patient left the Dermatology Clinic in good condition. The patient was counseled to follow up for suture removal in approximately 11-14 days.    Follow up in 1 year, earlier for new or changing lesions.     Staff Involved:  Scribe/Staff    Scribe Disclosure:   I, Andreia Chavarria, am serving as a scribe to document services personally performed by Dr. Carin Melendez, based on data collection and the provider's statements to me.     Provider Disclosure:   The documentation recorded by the scribe accurately reflects the services I personally performed and the decisions made by me.    Carin Melendez MD    Department of Dermatology  [facility]: Phone: [phone], Fax:[phone]  [facility]: Phone: [phone], Fax: [phone]

## 2017-06-15 NOTE — LETTER
6/15/2017       RE: Mayi Aleman  [address] [address] [address]     Dear Colleague,    Thank you for referring your patient, Mayi Aleman, to the [facility]. Please see a copy of my visit note below.    [facility] Dermatology Note      Dermatology Problem List:  1. Consistent with pigmented spindle cell nevus, right upper back  -s/p excision 8/11/2015  2. History of dysplastic nevi  -Compound dysplastic nevus with moderate to severe atypia, right posterior shoulder, s/p excision 10/6/2016  -Compound dysplastic nevus with moderate to severe atypia, right chest, s/p excision 8/11/2015    Encounter Date: Marcos 15, 2017    CC:  Chief Complaint   Patient presents with     Derm Problem     yearly skin check hx DN          History of Present Illness:  Ms. Mayi Aleman is a 22 year old female who presents as a follow-up for history of spindle cell nevus and dysplastic nevi. The patient was last seen 10/6/2016 by Dr. Frank when a dysplastic nevus on the right posterior shoulder was treated with excision. Today, the patient reports that her prior excision on the right posterior shoulder went well. She would like to discuss scar treatment. The scar is pruritic and she is concerned with aesthetic appearance.     She thinks that lesion on the right groin is unchanged. She has had the lesion for the past few years.     Reports another lesion on her genitals that sticks out and catches. The patient reports no other lesions of concern.     Past Medical History:   Patient Active Problem List   Diagnosis     Contraception     Exercise-induced asthma     Intermittent asthma     Past Medical History:   Diagnosis Date     Exercise-induced asthma 3/4/2011     Past Surgical History:   Procedure Laterality Date     APPENDECTOMY  12/08/06    Laparoscopic      BIOPSY OF SKIN LESION       COLONOSCOPY  1/28/2013     Procedure: COLONOSCOPY;  Colonoscopy;  Surgeon: Jamison Christianson MD;  Location:  GI     Social History:  The patient works as a nurse at [employer]. The patient does not smoke. The patient drinks 1-2 alcoholic beverages per week.     Family History:  There is a family history of possible melanoma skin cancer, grandfather.  There is a family history of asthma.  There is no family history of hay fever, psoriasis or eczema.     Medications:  Current Outpatient Prescriptions   Medication Sig Dispense Refill     levonorgestrel-ethinyl estradiol (SEASONALE) 0.15-0.03 MG per tablet Take 1 tablet by mouth daily 91 tablet 3     ondansetron (ZOFRAN ODT) 4 MG ODT tab Take 4-8 mg by mouth every 8 hours as needed for nausea Reported on 3/21/2017 20 tablet 1     Spacer/Aero-Holding Chambers GRISELDA 1 Device daily. 1 Device prn     albuterol 90 MCG/ACT inhaler Inhale 2 puffs into the lungs every 6 hours as needed for shortness of breath / dyspnea. 1 Inhaler 12     Allergies   Allergen Reactions     Sulfate      Review of Systems:  -Const: The patient is generally feeling well today.   -Skin: As above in HPI. No additional skin concerns.     Physical exam:  There were no vitals taken for this visit.  GEN: This is a well developed, well-nourished female in no acute distress, in a pleasant mood.    SKIN: Full skin, which includes the head/face, both arms, chest, back, abdomen,both legs, genitalia and/or groin buttocks, digits and/or nails, was examined.  -There are well healed surgical scars without erythema, nodularity or telangiectasias on the right upper back, right posterior shoulder and right chest.   -Irregularly shaped darker pigmented papule on the left buttock.   -Slightly hypertrophic 2cm scar on the right upper back.  -Lesions in groin are WNL.   -Pigmented irregularly shaped 1.1cm patch of the right groin.   -No other lesions of concern on areas examined.     Impression/Plan:  1. History of spindle cell nevus, no clinical  evidence of recurrence  Recommend self skin exams every 3 months.   ABCD's of melanoma were reviewed with patient and handout provided.   Recommend sunscreens SPF #30 or greater, protective clothing and avoidance of tanning beds.  2. History of dysplastic nevus, no clinical evidence of recurrence.   3. Hypertrophic 2cm  scar, right upper back. History of pruritus    Plan to send letter to insurance to discuss coverage of CO2RE and PDL.     Kenalog intralesional injection procedure note: After verbal consent and discussion of risks including but not limited to atrophy, pain, and bruising, time out was performed, the patient underwent positioning and the area was prepped with isopropyl alcohol, 0.20 total cc of Kenalog 5 mg/cc was injected into 1 lesion on the right upper back.  The patient tolerated the procedure well and left the Dermatology clinic in good condition.      Recommended scar sheets/silicone  4. Irregularly shaped darker pigmented papule, left buttock. Neoplasm of uncertain behavior. Differential diagnosis includes benign congenital nevus versus dysplastic nevus-patient did not want shave    Punch biopsy:  After discussion of benefits and risks including but not limited to bleeding/bruising, pain/swelling, infection, scar, incomplete removal, nerve damage/numbness, recurrence, and non-diagnostic biopsy, written consent, verbal consent and photographs were obtained. Time-out was performed. The area was cleaned with isopropyl alcohol. 0.5 mL of 1% lidocaine with epinephrine was injected to obtain adequate anesthesia of the lesion on the left buttocks. A 4 mm punch biopsy was performed.  4-0 prolene sutures were utilized to approximate the epidermal edges.  White petroleum jelly/VaselineTM and a bandage was applied to the wound.  Explicit verbal and written wound care instructions were provided.  The patient left the Dermatology Clinic in good condition. The patient was counseled to follow up for suture  removal in approximately 11-14 days.  5. Pigmented irregularly shaped 1.1cm patch, right groin. Neoplasm of uncertain behavior. Differential diagnosis includes benign congenital nevus versus dysplastic nevus, changed compared to prior photo. -did not want shave    Punch biopsy:  After discussion of benefits and risks including but not limited to bleeding/bruising, pain/swelling, infection, scar, incomplete removal, nerve damage/numbness, recurrence, and non-diagnostic biopsy, written consent, verbal consent and photographs were obtained. Time-out was performed. The area was cleaned with isopropyl alcohol. 0.5 mL of 1% lidocaine with epinephrine was injected to obtain adequate anesthesia of the lesion on the right groin. A 4 mm punch biopsy was performed.  4-0 prolene sutures were utilized to approximate the epidermal edges.  White petroleum jelly/VaselineTM and a bandage was applied to the wound.  Explicit verbal and written wound care instructions were provided.  The patient left the Dermatology Clinic in good condition. The patient was counseled to follow up for suture removal in approximately 11-14 days.    Follow up in 1 year, earlier for new or changing lesions.     Staff Involved:  Scribe/Staff    Scribe Disclosure:   I, Andreia Chavarria, am serving as a scribe to document services personally performed by Dr. Carin Melendez, based on data collection and the provider's statements to me.     Provider Disclosure:   The documentation recorded by the scribe accurately reflects the services I personally performed and the decisions made by me.    Carin Melendez MD    Department of Dermatology  [facility]: Phone: [phone], Fax:[phone]  [facility]: Phone: [phone], Fax: [phone]        Again, thank you for allowing me to participate in the care of your patient.      Sincerely,    Carin  MD Al

## 2017-06-15 NOTE — NURSING NOTE
Dermatology Rooming Note    Mayi Aleman's goals for this visit include:   Chief Complaint   Patient presents with     Derm Problem     yearly skin check hx DN        Is a scribe okay for this visit:YES    Are records needed for this visit(If yes, obtain release of information): No     Vitals: There were no vitals taken for this visit.    Referring Provider:  ESTABLISHED PATIENT  No address on file

## 2017-06-19 ENCOUNTER — TELEPHONE (OUTPATIENT)
Dept: DERMATOLOGY | Facility: CLINIC | Age: 22
End: 2017-06-19

## 2017-06-20 LAB — COPATH REPORT: NORMAL

## 2017-06-21 ENCOUNTER — TELEPHONE (OUTPATIENT)
Dept: DERMATOLOGY | Facility: CLINIC | Age: 22
End: 2017-06-21

## 2017-06-21 NOTE — TELEPHONE ENCOUNTER
Notes Recorded by Cristopher Middleton MA on 6/21/2017 at 11:47 AM  Pt called back, this cma went through results, pt verbalized understanding, she did not want to schedule 6 month recheck at this time due to her work schedule.     Cristopher Middleton CMA     ------    Notes Recorded by Zoey Nesbitt LPN on 6/21/2017 at 11:40 AM    Left message for patient to call [facility] in [place] back at [phone]    Zoey Newell LPN    ------    Notes Recorded by Carin Melendez MD on 6/20/2017 at 5:28 PM  Call patient. Both moles atypical. Recheck in 6 months and if either grow back need more removal. No more treatment at this time.        6d ago       Copath Report Patient Name: DAVID LOPEZ   MR#: [id number]   Specimen #: [id number]   Collected: 6/15/2017   Received: 6/16/2017   Reported: 6/20/2017 15:53   Ordering Phy(s): CARIN MELENDEZ     For improved result formatting, select 'View Enhanced Report Format'   under Linked Documents section.     SPECIMEN(S):   A: Skin, right groin   B: Skin, left buttock     FINAL DIAGNOSIS:   A.  Skin, groin, right:   - Compound nevus with atypical features - (see description)     B.  Skin, buttock, left:   - Compound nevus with moderate dysplasia - (see description)

## 2017-06-28 NOTE — TELEPHONE ENCOUNTER
CPT 54170 with ICD10 L91.0 approved for 6 visits by [employer]. Patient will be responsible for their deductible before insurance starts to contribute. Once the deductible is met the patient will be responsible for their normal coinsurance.   Case# [id number]    Please let me know if you have any questions.

## 2017-06-29 NOTE — TELEPHONE ENCOUNTER
I spoke to the patient and advised her of the approval and benefits.     This is what I sent insurance and they responded saying that 6 visits were approved for CPT 35984:    We are writing to request coverage of pulse dye laser and CO2 laser. This patient has symptoms of including itching. Scar is also hypertrophic.     This type of scarresponds well to pulsed dye laser. We would expect 1 treatments every 6 weeks for a maximum of 3-6 treatments.     The CPT Code Description utilized would be 05691: Unlisted procedure, skin, mucous membrane and subcutaneous tissue.  The pricing is $150 dollars. In addition, we would like to treated it with the CO2 laser 2-3 times. This is the same code with pricing of $350 for each treatment.

## 2017-06-29 NOTE — TELEPHONE ENCOUNTER
Is the patient approved for six treatments of each the PDL and Co2re?  Did you call the patient and inform her of the information below? We will contact the patient to schedule once you have reviewed her deductible and coverage with her.     Thank you    Abby De Leon CMA

## 2017-08-28 ENCOUNTER — TELEPHONE (OUTPATIENT)
Dept: FAMILY MEDICINE | Facility: OTHER | Age: 22
End: 2017-08-28

## 2017-08-28 NOTE — TELEPHONE ENCOUNTER
Patient calling in regards to her birth control prescription. She states she called the pharmacy here in ER and was told they don't have any refills available on file.  New rx was sent over on 03/21/17 for a 1 year supply. Spoke with pharmacy - she has available refills . Might have been trying to call in with an old refill number.    Patient informed. Will  soon.  Karli Matos, CMA

## 2017-09-26 ENCOUNTER — OFFICE VISIT (OUTPATIENT)
Dept: FAMILY MEDICINE | Facility: CLINIC | Age: 22
End: 2017-09-26
Payer: COMMERCIAL

## 2017-09-26 VITALS
HEIGHT: 64 IN | TEMPERATURE: 97.5 F | OXYGEN SATURATION: 97 % | WEIGHT: 137.2 LBS | DIASTOLIC BLOOD PRESSURE: 80 MMHG | SYSTOLIC BLOOD PRESSURE: 124 MMHG | BODY MASS INDEX: 23.42 KG/M2 | HEART RATE: 65 BPM

## 2017-09-26 DIAGNOSIS — Z80.3 FAMILY HISTORY OF MALIGNANT NEOPLASM OF BREAST: Primary | ICD-10-CM

## 2017-09-26 PROCEDURE — 99213 OFFICE O/P EST LOW 20 MIN: CPT | Performed by: FAMILY MEDICINE

## 2017-09-26 NOTE — PROGRESS NOTES
SUBJECTIVE:   Mayi Aleman is a 22 year old female who presents to clinic today for the following health issues:    Pt here today to be tested for breast cancer gene. Pts grandmother recently dx with breast cancer.  Her grandmother is going to have a mastectomy but she is not sure if she had the genetic testing on the breast cancer type .  Wants to do a genetic testing for herself and see if she would be OK to conintue on the BC pill she is on currently       Problem list and histories reviewed & adjusted, as indicated.  Additional history: as documented    Patient Active Problem List   Diagnosis     Contraception     Exercise-induced asthma     Intermittent asthma     Family history of malignant neoplasm of breast     Past Surgical History:   Procedure Laterality Date     APPENDECTOMY  12/08/06    Laparoscopic      BIOPSY OF SKIN LESION       COLONOSCOPY  1/28/2013    Procedure: COLONOSCOPY;  Colonoscopy;  Surgeon: Jamison Christianson MD;  Location:  GI       Social History   Substance Use Topics     Smoking status: Never Smoker     Smokeless tobacco: Never Used      Comment: Mom smokes outside     Alcohol use No     Family History   Problem Relation Age of Onset     Asthma Mother      Hypertension Maternal Grandmother      Lipids Maternal Grandmother      Asthma Maternal Grandmother      CANCER Paternal Grandfather      skin         Current Outpatient Prescriptions   Medication Sig Dispense Refill     levonorgestrel-ethinyl estradiol (SEASONALE) 0.15-0.03 MG per tablet Take 1 tablet by mouth daily 91 tablet 3     ondansetron (ZOFRAN ODT) 4 MG ODT tab Take 4-8 mg by mouth every 8 hours as needed for nausea Reported on 3/21/2017 20 tablet 1     Spacer/Aero-Holding Chambers GRISELDA 1 Device daily. 1 Device prn     albuterol 90 MCG/ACT inhaler Inhale 2 puffs into the lungs every 6 hours as needed for shortness of breath / dyspnea. 1 Inhaler 12     Allergies   Allergen Reactions     Sulfate      No lab results  "found.   BP Readings from Last 3 Encounters:   09/26/17 124/80   03/21/17 112/72   02/10/17 122/78    Wt Readings from Last 3 Encounters:   09/26/17 137 lb 3.2 oz (62.2 kg)   03/21/17 132 lb 12.8 oz (60.2 kg)   02/10/17 131 lb (59.4 kg)                  Labs reviewed in EPIC          Review[facility] and [facility]  M[facility]       Reviewed and updated as needed this visit by Provider         ROS:  Constitutional, HEENT, cardiovascular, pulmonary, GI, , musculoskeletal, neuro, skin, endocrine and psych systems are negative, except as otherwise noted.      OBJECTIVE:   /80  Pulse 65  Temp 97.5  F (36.4  C) (Oral)  Ht 5' 4\" (1.626 m)  Wt 137 lb 3.2 oz (62.2 kg)  LMP  (LMP Unknown)  SpO2 97%  Breastfeeding? No  BMI 23.55 kg/m2  Body mass index is 23.55 kg/(m^2).  GENERAL: healthy, alert and no distress  MS: no gross musculoskeletal defects noted, no edema    Diagnostic Test Results:  none     ASSESSMENT/PLAN:       1. Family history of malignant neoplasm of breast  We discussed a referral for  and also the cancer risk management program and I have given her the referral   - CANCER RISK MGMT/CANCER GENETIC COUNSELING REFERRAL  - GENETICS REFERRAL    RTC if no improving or worsening.  Pt is aware  and comfortable with the current plan.      Yisel Benson MD  [facility]  "

## 2017-09-26 NOTE — MR AVS SNAPSHOT
After Visit Summary   9/26/2017    Mayi Aleman    MRN: [id number]           Patient Information     Date Of Birth          1995        Visit Information        Provider Department      9/26/2017 9:30 AM Yisel Benson MD [facility]        Today's Diagnoses     Family history of malignant neoplasm of breast    -  1       Follow-ups after your visit        Additional Services     CANCER RISK MGMT/CANCER GENETIC COUNSELING REFERRAL       Your provider has referred you to the Cancer Risk Management Program - Cancer Genetic Counseling.    Reason for Ref[place]: [place] [facility] [place]     We have a sent a notice to a staff member of the Cancer Risk Management Program to give you a call to assist with scheduling your appointment.  You may also call  [phone] ([phone]) to initiate scheduling.    Please be aware that coverage of these services is subject to the terms and limitations of your health insurance plan.  Call member services at your health plan with any benefit or coverage questions.      Please bring the completed family history sheet to your appointment in addition to any available outside medical records documenting your cancer diagnosis.            GENETICS REFERRAL       Your provider has referred you to: [facility]: Adult Genetics Clinic [facility] [phone]   [email or web address]    Please be aware that coverage of these services is subject to the terms and limitations of your health insurance plan.  Call member services at your health plan with any benefit or coverage questions.      Please bring the following with you to your appointment:    (1) Any X-Rays, CTs or MRIs which have been performed.  Contact the facility where they were done to arrange for  prior to your scheduled appointment.   (2) List of current medications   (3) This referral request   (4) Any documents/labs given to  "you for this referral                  Who to contact     If you have questions or need follow up information about today's clinic visit or your schedule please contact [facility] directly at [phone].  Normal or non-critical lab and imaging results will be communicated to you by MyChart, letter or phone within 4 business days after the clinic has received the results. If you do not hear from us within 7 days, please contact the clinic through MyChart or phone. If you have a critical or abnormal lab result, we will notify you by phone as soon as possible.  Submit refill requests through [employer] or call your pharmacy and they will forward the refill request to us. Please allow 3 business days for your refill to be completed.          Additional Information About Your Visit        [employer] Information     [employer] gives you secure access to your electronic health record. If you see a primary care provider, you can also send messages to your care team and make appointments. If you have questions, please call your primary care clinic.  If you do not have a primary care provider, please call [phone] and they will assist you.        Care EveryWhere ID     This is your Care EveryWhere ID. This could be used by other organizations to access your [place] medical records  [id number]        Your Vitals Were     Pulse Temperature Height Last Period Pulse Oximetry Breastfeeding?    65 97.5  F (36.4  C) (Oral) 5' 4\" (1.626 m) (LMP Unknown) 97% No    BMI (Body Mass Index)                   23.55 kg/m2            Blood Pressure from Last 3 Encounters:   09/26/17 124/80   03/21/17 112/72   02/10/17 122/78    Weight from Last 3 Encounters:   09/26/17 137 lb 3.2 oz (62.2 kg)   03/21/17 132 lb 12.8 oz (60.2 kg)   02/10/17 131 lb (59.4 kg)              We Performed the Following     CANCER RISK MGMT/CANCER GENETIC COUNSELING REFERRAL     GENETICS REFERRAL        Primary Care Provider Office Phone # Fax #    Edie " Shweta Victor [phone]       [address] [facility] [address]        Equal Access to Services     [place] : Hellen esau whitney fermin Voss, zuleyka annayanet, joo nance, sebastien leone sydneemarjorie sam laromanradha lay. So [facility] [phone].    ATENCIÓN: Si habla español, tiene a martino disposición servicios gratuitos de asistencia lingüística. Llame al [phone].    We comply with applicable federal civil rights laws and [place] laws. We do not discriminate on the basis of race, color, national origin, age, disability sex, sexual orientation or gender identity.            Thank you!     Thank you for choosing [facility]  for your care. Our goal is always to provide you with excellent care. Hearing back from our patients is one way we can continue to improve our services. Please take a few minutes to complete the written survey that you may receive in the mail after your visit with us. Thank you!             Your Updated Medication List - Protect others around you: Learn how to safely use, store and throw away your medicines at www.disposemymeds.org.          This list is accurate as of: 9/26/17  9:48 AM.  Always use your most recent med list.                   Brand Name Dispense Instructions for use Diagnosis    albuterol 90 MCG/ACT inhaler     1 Inhaler    Inhale 2 puffs into the lungs every 6 hours as needed for shortness of breath / dyspnea.    Asthma, exercise induced       levonorgestrel-ethinyl estradiol 0.15-0.03 MG per tablet    SEASONALE    91 tablet    Take 1 tablet by mouth daily    Encounter for surveillance of contraceptive pills       spacer/aero-hold chamber mask Seema     1 Device    1 Device daily.    Asthma, exercise induced       ZOFRAN ODT 4 MG ODT tab   Generic drug:  ondansetron     20 tablet    Take 4-8 mg by mouth every 8 hours as needed for nausea Reported on 3/21/2017    Gastroenteritis

## 2017-09-27 ASSESSMENT — ASTHMA QUESTIONNAIRES: ACT_TOTALSCORE: 25

## 2017-11-08 NOTE — TELEPHONE ENCOUNTER
Pt called requesting to schedule laser treatment in March. Pt states that she was told she would be covered at 100%. RN notified pt that RN will route this to FC to review and clarify if both lasers are covered at 100%. Pt verbalized understanding  FC please review and advise. Thank you..Tootie Anderson RN

## 2017-11-08 NOTE — TELEPHONE ENCOUNTER
Pt called and notified of FC's message below and states that she is going to have the same plan as she does now. RN advised that a new PA may need to be submitted after January 1st.  Pt verbalized understanding and is okay with this. RN will route to  to notify to please resubmit this PA after the first of the year and notify pt of results and then route to [facility] if pt interested in scheduling for March. Thank you...Tootie Anderson RN

## 2017-11-08 NOTE — TELEPHONE ENCOUNTER
We can definitely look into that for the patient. The only issue is her insurance right now might be different than what they will have in the new year.We will have to review this after January 1st. We won't know her next year benefits.

## 2018-01-12 ENCOUNTER — TELEPHONE (OUTPATIENT)
Dept: DERMATOLOGY | Facility: CLINIC | Age: 23
End: 2018-01-12

## 2018-01-12 NOTE — TELEPHONE ENCOUNTER
Talked with Yonny at [employer] and told me we would need to re PA since it's been over 6 months since last one was sent in. Waiting to hear back from Nolvia at [facility] to give me the fax number that it should be sent to      Called Mayi to let her know what was going on and will update her when PA comes back 01/12/18

## 2018-01-12 NOTE — TELEPHONE ENCOUNTER
Yonny called back from [employer] saying after reviewing the case, the PA that was sent in last year is still good. Auth # [id number] 06/28/17-06/27/18    Will let the patient know that we are still good to go.    This is what I sent insurance and they responded saying that 6 visits were approved for CPT 18876:     We are writing to request coverage of pulse dye laser and CO2 laser. This patient has symptoms of including itching. Scar is also hypertrophic.     This type of scarresponds well to pulsed dye laser. We would expect 1 treatments every 6 weeks for a maximum of 3-6 treatments.     The CPT Code Description utilized would be 38330: Unlisted procedure, skin, mucous membrane and subcutaneous tissue.  The pricing is $150 dollars. In addition, we would like to treated it with the CO2 laser 2-3 times. This is the same code with pricing of $350 for each treatment.

## 2018-02-14 DIAGNOSIS — Z30.41 ENCOUNTER FOR SURVEILLANCE OF CONTRACEPTIVE PILLS: ICD-10-CM

## 2018-02-15 RX ORDER — LEVONORGESTREL AND ETHINYL ESTRADIOL 0.15-0.03
1 KIT ORAL DAILY
Qty: 91 TABLET | Refills: 3 | OUTPATIENT
Start: 2018-02-15

## 2018-02-15 NOTE — TELEPHONE ENCOUNTER
Refill not appropriate.  Rx sent to the requesting pharmacy on 3/21/17 for a 3 month supply with an additional 3 refills.  Pt shouldn't be out for another month.    Mimi Peña RN

## 2018-02-19 ENCOUNTER — MYC REFILL (OUTPATIENT)
Dept: FAMILY MEDICINE | Facility: OTHER | Age: 23
End: 2018-02-19

## 2018-02-19 DIAGNOSIS — Z30.41 ENCOUNTER FOR SURVEILLANCE OF CONTRACEPTIVE PILLS: ICD-10-CM

## 2018-02-19 RX ORDER — LEVONORGESTREL AND ETHINYL ESTRADIOL 0.15-0.03
1 KIT ORAL DAILY
Qty: 91 TABLET | Refills: 0 | Status: SHIPPED | OUTPATIENT
Start: 2018-02-19 | End: 2018-05-18

## 2018-02-19 NOTE — TELEPHONE ENCOUNTER
Message from [facility]:  Original authorizing provider: MEI Guzman CNP would like a refill of the following medications:  levonorgestrel-ethinyl estradiol (SEASONALE) 0.15-0.03 MG per tablet [MEI Guzman CNP]    Preferred pharmacy: [place] [id number]    Comment:

## 2018-02-19 NOTE — TELEPHONE ENCOUNTER
Requested Prescriptions   Pending Prescriptions Disp Refills     levonorgestrel-ethinyl estradiol (SEASONALE) 0.15-0.03 MG per tablet 91 tablet 3     Sig: Take 1 tablet by mouth daily    There is no refill protocol information for this order        Medication is being filled for 1 time refill only due to:  Patient needs to be seen because yearly appointment is due in March 2018. Patient notified via [employer].    Pat Melo RN, BSN

## 2018-05-18 ENCOUNTER — OFFICE VISIT (OUTPATIENT)
Dept: FAMILY MEDICINE | Facility: CLINIC | Age: 23
End: 2018-05-18
Payer: COMMERCIAL

## 2018-05-18 VITALS
BODY MASS INDEX: 24.79 KG/M2 | WEIGHT: 144.4 LBS | HEART RATE: 77 BPM | RESPIRATION RATE: 16 BRPM | SYSTOLIC BLOOD PRESSURE: 119 MMHG | OXYGEN SATURATION: 97 % | DIASTOLIC BLOOD PRESSURE: 78 MMHG | TEMPERATURE: 99.5 F

## 2018-05-18 DIAGNOSIS — Z30.41 ENCOUNTER FOR SURVEILLANCE OF CONTRACEPTIVE PILLS: ICD-10-CM

## 2018-05-18 DIAGNOSIS — Z00.00 ENCOUNTER FOR ROUTINE ADULT HEALTH EXAMINATION WITHOUT ABNORMAL FINDINGS: Primary | ICD-10-CM

## 2018-05-18 DIAGNOSIS — J45.20 MILD INTERMITTENT ASTHMA WITHOUT COMPLICATION: ICD-10-CM

## 2018-05-18 PROCEDURE — 99395 PREV VISIT EST AGE 18-39: CPT | Performed by: FAMILY MEDICINE

## 2018-05-18 PROCEDURE — 87591 N.GONORRHOEAE DNA AMP PROB: CPT | Performed by: FAMILY MEDICINE

## 2018-05-18 PROCEDURE — 87491 CHLMYD TRACH DNA AMP PROBE: CPT | Performed by: FAMILY MEDICINE

## 2018-05-18 RX ORDER — LEVONORGESTREL AND ETHINYL ESTRADIOL 0.15-0.03
1 KIT ORAL DAILY
Qty: 91 TABLET | Refills: 4 | Status: SHIPPED | OUTPATIENT
Start: 2018-05-18 | End: 2019-07-21

## 2018-05-18 NOTE — PROGRESS NOTES
SUBJECTIVE:   Mayi Aleman is a 23 year old female who presents to clinic today for the following health issues:      Medication Followup of birthcontrol     Taking Medication as prescribed: yes    Side Effects:  None    Medication Helping Symptoms:  yes           Problem list and histories reviewed & adjusted, as indicated.  Additional history: as documented    Patient Active Problem List   Diagnosis     Contraception     Exercise-induced asthma     Intermittent asthma     Family history of malignant neoplasm of breast     Past Surgical History:   Procedure Laterality Date     APPENDECTOMY  12/08/06    Laparoscopic      BIOPSY OF SKIN LESION       COLONOSCOPY  1/28/2013    Procedure: COLONOSCOPY;  Colonoscopy;  Surgeon: Jamison Christianson MD;  Location:  GI       Social History   Substance Use Topics     Smoking status: Never Smoker     Smokeless tobacco: Never Used      Comment: Mom smokes outside     Alcohol use No     Family History   Problem Relation Age of Onset     Asthma Mother      Hypertension Maternal Grandmother      Lipids Maternal Grandmother      Asthma Maternal Grandmother      CANCER Paternal Grandfather      skin         Current Outpatient Prescriptions   Medication Sig Dispense Refill     albuterol 90 MCG/ACT inhaler Inhale 2 puffs into the lungs every 6 hours as needed for shortness of breath / dyspnea. 1 Inhaler 12     levonorgestrel-ethinyl estradiol (SEASONALE) 0.15-0.03 MG per tablet Take 1 tablet by mouth daily 91 tablet 4     Spacer/Aero-Holding Chambers GRISELDA 1 Device daily. 1 Device prn     ondansetron (ZOFRAN ODT) 4 MG ODT tab Take 4-8 mg by mouth every 8 hours as needed for nausea Reported on 3/21/2017 20 tablet 1     [DISCONTINUED] levonorgestrel-ethinyl estradiol (SEASONALE) 0.15-0.03 MG per tablet Take 1 tablet by mouth daily 91 tablet 0     Allergies   Allergen Reactions     Sulfate      No lab results found.   BP Readings from Last 3 Encounters:   05/18/18 119/78   09/26/17  124/80   03/21/17 112/72    Wt Readings from Last 3 Encounters:   05/18/18 144 lb 6.4 oz (65.5 kg)   09/26/17 137 lb 3.2 oz (62.2 kg)   03/21/17 132 lb 12.8 oz (60.2 kg)                  Labs reviewed in EPIC    Reviewed and updated as needed this visit by clinical staff  Tobacco  Allergies  Meds  Med Hx  Surg Hx  Fam Hx       Reviewed and updated as needed this visit by Provider         ROS:  Constitutional, HEENT, cardiovascular, pulmonary, GI, , musculoskeletal, neuro, skin, endocrine and psych systems are negative, except as otherwise noted.    OBJECTIVE:     /78  Pulse 77  Temp 99.5  F (37.5  C) (Tympanic)  Resp 16  Wt 144 lb 6.4 oz (65.5 kg)  LMP 03/05/2018  SpO2 97%  Breastfeeding? No  BMI 24.79 kg/m2  Body mass index is 24.79 kg/(m^2).  GENERAL: healthy, alert and no distress  EYES: Eyes grossly normal to inspection, PERRL and conjunctivae and sclerae normal  HENT: ear canals and TM's normal, nose and mouth without ulcers or lesions  NECK: no adenopathy, no asymmetry, masses, or scars and thyroid normal to palpation  RESP: lungs clear to auscultation - no rales, rhonchi or wheezes  CV: regular rate and rhythm, normal S1 S2, no S3 or S4, no murmur, click or rub, no peripheral edema and peripheral pulses strong  ABDOMEN: soft, nontender, no hepatosplenomegaly, no masses and bowel sounds normal  MS: no gross musculoskeletal defects noted, no edema  NEURO: Normal strength and tone, mentation intact and speech normal  LYMPH: no cervical, supraclavicular, axillary, or inguinal adenopathy    Diagnostic Test Results:  none     ASSESSMENT/PLAN:     1. Encounter for routine adult health examination without abnormal findings  Discussed diet,calcium,exercise.Went over self breast exam.Thin prep was NOTdone.Eyes and teeth UTD.No immunizations needed today.See orders below for tests ordered and screening needed.    - NEISSERIA GONORRHOEA PCR  - CHLAMYDIA TRACHOMATIS PCR    2. Encounter for  surveillance of contraceptive pills  Refilled for one yr , she is happy with it , gets her periods q 3 months wants to continue , she is due for a PAP next yr   - levonorgestrel-ethinyl estradiol (SEASONALE) 0.15-0.03 MG per tablet; Take 1 tablet by mouth daily  Dispense: 91 tablet; Refill: 4    RTC if no improving or worsening.      Yisel Benson MD  [facility]

## 2018-05-18 NOTE — MR AVS SNAPSHOT
After Visit Summary   5/18/2018    Mayi Aleman    MRN: [id number]           Patient Information     Date Of Birth          1995        Visit Information        Provider Department      5/18/2018 3:15 PM Yisel Benson MD [facility]        Today's Diagnoses     Encounter for routine adult health examination without abnormal findings    -  1    Encounter for surveillance of contraceptive pills        Mild intermittent asthma without complication           Follow-ups after your visit        Additional Services     ASTHMA EDUCATION REFERRAL         Please be aware that coverage of these services is subject to the terms and limitations of your health insurance plan.  Call member services at your health plan with any benefit or coverage questions.      Please bring the following to your appointment:     >>   List of current medications  >>   This referral request   >>   Any documents/labs given to you for this referral  Your spacer device and/or peak meter if you have one                  Your next 10 appointments already scheduled     Jul 31, 2018 12:45 PM CDT   [facility] Visit with [facility]in [facility]endez [facility]    [address] [place] [id number] [phone]              Who to contact     If you have questions or need follow up information about today's clinic visit or your schedule please contact [facility] directly at [phone].  Normal or non-critical lab and imaging results will be communicated to you by MyChart, letter or phone within 4 business days after the clinic has received the results. If you do not hear from us within 7 days, please contact the clinic through MyChart or phone. If you have a critical or abnormal lab result, we will notify you by phone as soon as possible.  Submit refill requests through [employer] [employer] or call your pharmacy and they will forward the refill request to us. Please allow  3 business days for your refill to be completed.          Additional Information About Your Visit        MyChart Information     [employer] gives you secure access to your electronic health record. If you see a primary care provider, you can also send messages to your care team and make appointments. If you have questions, please call your primary care clinic.  If you do not have a primary care provider, please call [phone] and they will assist you.        Care EveryWhere ID     This is your Care EveryWhere ID. This could be used by other organizations to access your [facility] medical records  [id number]        Your Vitals Were     Pulse Temperature Respirations Last Period Pulse Oximetry Breastfeeding?    77 99.5  F (37.5  C) (Tympanic) 16 03/05/2018 97% No    BMI (Body Mass Index)                   24.79 kg/m2            Blood Pressure from Last 3 Encounters:   05/18/18 119/78   09/26/17 124/80   03/21/17 112/72    Weight from Last 3 Encounters:   05/18/18 144 lb 6.4 oz (65.5 kg)   09/26/17 137 lb 3.2 oz (62.2 kg)   03/21/17 132 lb 12.8 oz (60.2 kg)              We Performed the Following     ASTHMA EDUCATION REFERRAL     CHLAMYDIA TRACHOMATIS PCR     NEISSERIA GONORRHOEA PCR          Where to get your medicines      These medications were sent to [place] [address] [place] [address]     Phone:  [phone]     levonorgestrel-ethinyl estradiol 0.15-0.03 MG per tablet          Primary Care Provider Office Phone # Fax #    Yisel Benson [phone] [address] [address]        Equal Access to Services     LEONEL HAMPTON : Hadtristen Voss, zuleyka jackson, sebastien lópez. So [facility] [phone].    ATENCIÓN: Si habla español, tiene a martino disposición servicios gratuitos de asistencia lingüística. Llame al [phone].    We comply with  applicable federal civil rights laws and [place] laws. We do not discriminate on the basis of race, color, national origin, age, disability, sex, sexual orientation, or gender identity.            Thank you!     Thank you for choosing [facility]  for your care. Our goal is always to provide you with excellent care. Hearing back from our patients is one way we can continue to improve our services. Please take a few minutes to complete the written survey that you may receive in the mail after your visit with us. Thank you!             Your Updated Medication List - Protect others around you: Learn how to safely use, store and throw away your medicines at www.disposemymeds.org.          This list is accurate as of 5/18/18  8:02 PM.  Always use your most recent med list.                   Brand Name Dispense Instructions for use Diagnosis    albuterol 90 MCG/ACT inhaler     1 Inhaler    Inhale 2 puffs into the lungs every 6 hours as needed for shortness of breath / dyspnea.    Asthma, exercise induced       levonorgestrel-ethinyl estradiol 0.15-0.03 MG per tablet    SEASONALE    91 tablet    Take 1 tablet by mouth daily    Encounter for surveillance of contraceptive pills       spacer/aero-hold chamber mask Seema     1 Device    1 Device daily.    Asthma, exercise induced       ZOFRAN ODT 4 MG ODT tab   Generic drug:  ondansetron     20 tablet    Take 4-8 mg by mouth every 8 hours as needed for nausea Reported on 3/21/2017    Gastroenteritis

## 2018-05-19 ASSESSMENT — ASTHMA QUESTIONNAIRES: ACT_TOTALSCORE: 25

## 2018-05-22 DIAGNOSIS — Z30.41 ENCOUNTER FOR SURVEILLANCE OF CONTRACEPTIVE PILLS: ICD-10-CM

## 2018-05-22 LAB
C TRACH DNA SPEC QL NAA+PROBE: NEGATIVE
N GONORRHOEA DNA SPEC QL NAA+PROBE: NEGATIVE
SPECIMEN SOURCE: NORMAL
SPECIMEN SOURCE: NORMAL

## 2018-05-22 RX ORDER — LEVONORGESTREL AND ETHINYL ESTRADIOL 0.15-0.03
KIT ORAL
Qty: 91 TABLET | Refills: 0 | OUTPATIENT
Start: 2018-05-22

## 2018-05-22 NOTE — TELEPHONE ENCOUNTER
Duplicate.  Rx sent to the requesting pharmacy on 5/18/18 for a 3 month supply with an additional 4 refills.    Mimi Peña RN

## 2018-06-13 ENCOUNTER — TELEPHONE (OUTPATIENT)
Dept: DERMATOLOGY | Facility: CLINIC | Age: 23
End: 2018-06-13

## 2018-06-13 NOTE — TELEPHONE ENCOUNTER
Left message for patient to call [phone].  The PA that was done for the Core and PDL has to be resubmitted.  It is only valid thorough 6/17/18.  The scheduling for the procedure should have been done at the [facility] which is the reason for the appointment being scheduled incorrectly.  It is also scheduled at the incorrect location.    I will send a message to our FC to see if they are able to redo another PA.    FC, please see telephone encounter from 1/12/18 in regards to the laser PA.    Abby De Leon, CMA

## 2018-06-13 NOTE — TELEPHONE ENCOUNTER
Reason for call:  Other   Patient called regarding (reason for call): appointment  Additional comments: Patient has appt in July with Samantha Garcia.  Says it should not be a return visit, but a C02 procedure.  Care team unavailable, not sure if this needs to be changed to procedure?  Patient just wanted to make sure she could get txmt, said, insurance already authorized the treatment.     Phone number to reach patient:  Cell number on file:    Telephone Information:   Mobile [phone]       Best Time:  any    Can we leave a detailed message on this number?  YES

## 2018-06-13 NOTE — TELEPHONE ENCOUNTER
Patient returned call and I informed her of the information below.  I scheduled her for August 1 with Dr. Melendez at the [facility].  She stated that she will call if that does not work.  I told her that we will call her as soon as we hear back from her insurance company.    I also informed her that she will receive a call from someone at the [facility] to discuss prep for the Core/PDL procedure.    Abby De Leon, CMA

## 2018-06-14 NOTE — TELEPHONE ENCOUNTER
Last office visit 06/15/18  PDL and CO2 laser scheduled for 08/1/18    Hypertrophic scar 2cm right upper back       Forwarding to PA team.

## 2018-06-15 NOTE — TELEPHONE ENCOUNTER
I called Nolvia DE LUNA at [employer] to see if we can get the existing auth extended. I left a message on her line, and I will also ask her to update the location of service, which is [place] [facility] Thanks! Genia

## 2018-07-06 ENCOUNTER — TELEPHONE (OUTPATIENT)
Dept: DERMATOLOGY | Facility: CLINIC | Age: 23
End: 2018-07-06

## 2018-07-06 NOTE — TELEPHONE ENCOUNTER
COR2E-Pre-visit call : Reviewed with the patient the COR2E procedure in detail using the CORE handout

## 2018-07-06 NOTE — TELEPHONE ENCOUNTER
7/6/2018 8:23AM [id number] Received PA approval from [facility] for 6 additional visits for PDL and Co2 laser treatments. Case#[id number]. Her appointment on 8/1/2018 should be covered. Thank you, Genia

## 2018-07-31 ENCOUNTER — OFFICE VISIT (OUTPATIENT)
Dept: DERMATOLOGY | Facility: CLINIC | Age: 23
End: 2018-07-31
Payer: COMMERCIAL

## 2018-07-31 DIAGNOSIS — L90.5 SCAR: ICD-10-CM

## 2018-07-31 DIAGNOSIS — Z86.018 HISTORY OF DYSPLASTIC NEVUS: Primary | ICD-10-CM

## 2018-07-31 PROCEDURE — 99213 OFFICE O/P EST LOW 20 MIN: CPT | Performed by: DERMATOLOGY

## 2018-07-31 ASSESSMENT — PAIN SCALES - GENERAL: PAINLEVEL: NO PAIN (0)

## 2018-07-31 NOTE — PROGRESS NOTES
[facility] Dermatology Note      Dermatology Problem List:  1. Consistent with pigmented spindle cell nevus, right upper back  -s/p excision 8/11/2015  2. History of dysplastic nevi  -compound nevus with moderate dysplastic on th left buttock 6/2017  -Compound dysplastic nevus with moderate to severe atypia, right posterior shoulder, s/p excision 10/6/2016  -Compound dysplastic nevus with moderate to severe atypia, right chest, s/p excision 8/11/2015    Encounter Date: Jul 31, 2018    CC:  Chief Complaint   Patient presents with     Derm Problem     skin check          History of Present Illness:  Ms. Mayi Aleman is a 23 year old female who presents as a follow-up for history of spindle cell nevus and dysplastic nevi. The patient was last seen  6/2017 when scar on the right posterior shoulder was injected, since that time she reports she has had hypertrophic scar. She is pending laser treatment on the scar. Additionally, there is an atypical nevus of groin also biopsied which the patient needs rechecked. No changes in home, work, or major medical problems. The patient reports no other lesions of concern.     Past Medical History:   Patient Active Problem List   Diagnosis     Contraception     Exercise-induced asthma     Intermittent asthma     Family history of malignant neoplasm of breast     Past Medical History:   Diagnosis Date     Exercise-induced asthma 3/4/2011     Past Surgical History:   Procedure Laterality Date     APPENDECTOMY  12/08/06    Laparoscopic      BIOPSY OF SKIN LESION       COLONOSCOPY  1/28/2013    Procedure: COLONOSCOPY;  Colonoscopy;  Surgeon: Jamison Christianson MD;  Location: [facility]     Social History:  The patient works as a nurse at [employer]. The patient does not smoke.   Kept in chart for convenience.       Family History:  There is a family history of possible melanoma skin cancer, grandfather.  There is a family history of asthma.  There is no family  history of hay fever, psoriasis or eczema.   Kept in chart for convenience.       Medications:  Current Outpatient Prescriptions   Medication Sig Dispense Refill     albuterol 90 MCG/ACT inhaler Inhale 2 puffs into the lungs every 6 hours as needed for shortness of breath / dyspnea. 1 Inhaler 12     levonorgestrel-ethinyl estradiol (SEASONALE) 0.15-0.03 MG per tablet Take 1 tablet by mouth daily 91 tablet 4     ondansetron (ZOFRAN ODT) 4 MG ODT tab Take 4-8 mg by mouth every 8 hours as needed for nausea Reported on 3/21/2017 20 tablet 1     Spacer/Aero-Holding Chambers GRISELDA 1 Device daily. 1 Device prn     Allergies   Allergen Reactions     Sulfate      Review of Systems:  -Const: The patient is generally feeling well today.   -Skin: As above in HPI. No additional skin concerns.     Physical exam:  There were no vitals taken for this visit.  GEN: This is a well developed, well-nourished female in no acute distress, in a pleasant mood.    SKIN: Total skin excluding the undergarment areas was performed. The exam included the head/face, neck, both arms, chest, back, abdomen, both legs, digits and/or nails. Nails painted.   - On the right groin, there is a pigmented patch that is well demarcated and homogenous will a well healed scar measuring 7 mm.  -Slightly hypertrophic 2cm scar on the right upper back.  -well healed scars at sites of prior biopsies with no repigmentation, including the site of the dysplastic nevus on the left buttock  - Multiple regular brown pigmented macules and papules are identified on the trunk and extremities.   - On the right lower paraspinal back, there is a 1 cm pigmented patch w/ normal pigment network with focal area of erythema and grey discoloration  -No other lesions of concern on areas examined.     Impression/Plan:  1. History of spindle cell nevus, no clinical evidence of recurrence  ABCD's of melanoma were reviewed with patient and handout provided.   Recommend sun  protection.    2. History of dysplastic nevus, no clinical evidence of recurrence including buttock    3. Hypertrophic 2cm  scar, right upper back. History of pruritus/ s/p kenalog. Patient is scheduled for laser treatment in 1 weeks    4. Compound nevus with atypical features, right groin. We did not intend to remove with biopsy,  Only small area was punch biopsies.     We will continue to monitor this area.     5. Multiple benign nevi, trunk and extremities    No further intervention needed.     6. On the right lower paraspinal back, there is a 1 cm pigmented patch w/ normal pigment network with focal area of erythema and grey discoloration. Possibly inflamed nevus, if not improved at follow up then needs biopsy    Photodocumentation obtained at today's visit - we will re-check at patient's laser appointment.     Follow up in 1 weeks for laser treatment and 3 weeks to recheck mole on back. Next total skin exam due 7/31/2019    Staff Involved:  Staff/Scribe    Scribe Disclosure  I, Fam Mayorga, am serving as a scribe to document services personally performed by Dr. Carin Melendez MD, based on data collection and the provider's statements to me.     Provider Disclosure:   The documentation recorded by the scribe accurately reflects the services I personally performed and the decisions made by me.    Carin Melendez MD    Department of Dermatology  [facility]: Phone: [phone], Fax:[phone]  [facility]: Phone: [phone], Fax: [phone]

## 2018-07-31 NOTE — NURSING NOTE
Mayi Aleman's goals for this visit include:   Chief Complaint   Patient presents with     Derm Problem     skin check        She requests these members of her care team be copied on today's visit information: yes     PCP: Ysiel Benson    Referring Provider:  No referring provider defined for this encounter.    There were no vitals taken for this visit.    Do you need any medication refills at today's visit? No     Amorrae PAIGE Middleton

## 2018-07-31 NOTE — LETTER
7/31/2018         RE: Mayi Aleman  [id number] [facility] [address]        Dear Colleague,    Thank you for referring your patient, Mayi Aleman, to the [facility]. Please see a copy of my visit note below.    [facility] Dermatology Note      Dermatology Problem List:  1. Consistent with pigmented spindle cell nevus, right upper back  -s/p excision 8/11/2015  2. History of dysplastic nevi  -compound nevus with moderate dysplastic on th left buttock 6/2017  -Compound dysplastic nevus with moderate to severe atypia, right posterior shoulder, s/p excision 10/6/2016  -Compound dysplastic nevus with moderate to severe atypia, right chest, s/p excision 8/11/2015    Encounter Date: Jul 31, 2018    CC:  Chief Complaint   Patient presents with     Derm Problem     skin check          History of Present Illness:  Ms. Mayi Aleman is a 23 year old female who presents as a follow-up for history of spindle cell nevus and dysplastic nevi. The patient was last seen  6/2017 when scar on the right posterior shoulder was injected, since that time she reports she has had hypertrophic scar. She is pending laser treatment on the scar. Additionally, there is an atypical nevus of groin also biopsied which the patient needs rechecked. No changes in home, work, or major medical problems. The patient reports no other lesions of concern.     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   Kept in chart for convenience.       Family History:  There is a family history of possible melanoma skin cancer, grandfather.  There is a family history of asthma.  There is no family history of hay fever, psoriasis or eczema.   Kept in chart for convenience.       Medications:  Current Outpatient Prescriptions   Medication Sig Dispense Refill     albuterol 90 MCG/ACT inhaler Inhale 2 puffs into the lungs every 6 hours as needed for shortness of breath / dyspnea. 1 Inhaler 12     levonorgestrel-ethinyl estradiol (SEASONALE) 0.15-0.03 MG per tablet Take 1 tablet by mouth daily 91 tablet 4     ondansetron (ZOFRAN ODT) 4 MG ODT tab Take 4-8 mg by mouth every 8 hours as needed for nausea Reported on 3/21/2017 20 tablet 1     Spacer/Aero-Holding Chambers GRISELDA 1 Device daily. 1 Device prn     Allergies   Allergen Reactions     Sulfate      Review of Systems:  -Const: The patient is generally feeling well today.   -Skin: As above in HPI. No additional skin concerns.     Physical exam:  There were no vitals taken for this visit.  GEN: This is a well developed, well-nourished female in no acute distress, in a pleasant mood.    SKIN: Total skin excluding the undergarment areas was performed. The exam included the head/face, neck, both arms, chest, back, abdomen, both legs, digits and/or nails. Nails painted.   - On the right groin, there is a pigmented patch that is well demarcated and homogenous will a well healed scar measuring 7 mm.  -Slightly hypertrophic 2cm scar on the right upper back.  -well healed scars at sites of prior biopsies with no repigmentation, including the site of the dysplastic nevus on the left buttock  - Multiple regular brown pigmented macules and papules are identified on the trunk and extremities.   - On the right lower paraspinal back, there is a 1 cm pigmented patch w/ normal pigment network with focal area of erythema and  grey discoloration  -No other lesions of concern on areas examined.     Impression/Plan:  1. History of spindle cell nevus, no clinical evidence of recurrence  ABCD's of melanoma were reviewed with patient and handout provided.   Recommend sun protection.    2. History of dysplastic nevus, no clinical evidence of recurrence including buttock    3. Hypertrophic 2cm  scar, right upper back. History of pruritus/ s/p kenalog. Patient is scheduled for laser treatment in 1 weeks    4. Compound nevus with atypical features, right groin. We did not intend to remove with biopsy,  Only small area was punch biopsies.     We will continue to monitor this area.     5. Multiple benign nevi, trunk and extremities    No further intervention needed.     6. On the right lower paraspinal back, there is a 1 cm pigmented patch w/ normal pigment network with focal area of erythema and grey discoloration. Possibly inflamed nevus, if not improved at follow up then needs biopsy    Photodocumentation obtained at today's visit - we will re-check at patient's laser appointment.     Follow up in 1 weeks for laser treatment and 3 weeks to recheck mole on back. Next total skin exam due 7/31/2019    Staff Involved:  Staff/Scribe    Scribe Disclosure  I, Fam Mayorga, am serving as a scribe to document services personally performed by Dr. Carin Melendez MD, based on data collection and the provider's statements to me.     Provider Disclosure:   The documentation recorded by the scribe accurately reflects the services I personally performed and the decisions made by me.    Carin Melednez MD    Department of Dermatology  [facility]: Phone: [phone], Fax:[phone]  [facility]: Phone: [phone], Fax: [phone]        Again, thank you for allowing me to participate in the care of your patient.         Sincerely,        Carin Melendez MD

## 2018-07-31 NOTE — MR AVS SNAPSHOT
After Visit Summary   7/31/2018    Mayi Aleman    MRN: [id number]           Patient Information     Date Of Birth          1995        Visit Information        Provider Department      7/31/2018 12:45 PM Carin Melendez MD [facility]        Today's Diagnoses     History of dysplastic nevus    -  1    Scar           Follow-ups after your visit        Follow-up notes from your care team     Return in about 1 week (around 8/7/2018) for for laser treatment and 3 weeks to look at mole.      Your next 10 appointments already scheduled     Aug 08, 2018  8:30 AM CDT   (Arrive by 7:50 AM)   Procedure with Carin Melendez MD   [facility] ([facility])    [address]  [address] [facility] [address] [phone]              Who to contact     If you have questions or need follow up information about today's clinic visit or your schedule please contact [facility] directly at [phone].  Normal or non-critical lab and imaging results will be communicated to you by [employer], letter or phone within 4 business days after the clinic has received the results. If you do not hear from us within 7 days, please contact the clinic through [employer] or phone. If you have a critical or abnormal lab result, we will notify you by phone as soon as possible.  Submit refill requests through Slidely or call your pharmacy and they will forward the refill request to us. Please allow 3 business days for your refill to be completed.          Additional Information About Your Visit        [employer] Information     Slidely gives you secure access to your electronic health record. If you see a primary care provider, you can also send messages to your care team and make appointments. If you have questions, please call your primary care clinic.  If you do not have a primary care provider, please call [phone] and they will assist you.       [employer] is an electronic gateway that provides easy, online access to your medical records. With [employer], you can request a clinic appointment, read your test results, renew a prescription or communicate with your care team.     To access your existing account, please contact your [facility] or call [phone] for assistance.        Care EveryWhere ID     This is your Care EveryWhere ID. This could be used by other organizations to access your [place] medical records  [id number]         Blood Pressure from Last 3 Encounters:   05/18/18 119/78   09/26/17 124/80   03/21/17 112/72    Weight from Last 3 Encounters:   05/18/18 65.5 kg (144 lb 6.4 oz)   09/26/17 62.2 kg (137 lb 3.2 oz)   03/21/17 60.2 kg (132 lb 12.8 oz)              Today, you had the following     No orders found for display       Primary Care Provider Office Phone # Fax #    Yisel Benson [phone]       [address] [address]        Equal Access to Services     [facility] : Hadii aad ku hadasho Solaloali, waaxda luqadaha, qaybta kaalmada goyo, sebastien castano . So [facility] [phone].    ATENCIÓN: Si habla español, tiene a martino disposición servicios gratuitos de asistencia lingüística. Wander al [phone].    We comply with applicable federal civil rights laws and [place] laws. We do not discriminate on the basis of race, color, national origin, age, disability, sex, sexual orientation, or gender identity.            Thank you!     Thank you for choosing [facility]  for your care. Our goal is always to provide you with excellent care. Hearing back from our patients is one way we can continue to improve our services. Please take a few minutes to complete the written survey that you may receive in the mail after your visit with us. Thank you!             Your Updated Medication List - Protect others around you: Learn how to  safely use, store and throw away your medicines at www.disposemymeds.org.          This list is accurate as of 7/31/18  1:00 PM.  Always use your most recent med list.                   Brand Name Dispense Instructions for use Diagnosis    albuterol 90 MCG/ACT inhaler     1 Inhaler    Inhale 2 puffs into the lungs every 6 hours as needed for shortness of breath / dyspnea.    Asthma, exercise induced       levonorgestrel-ethinyl estradiol 0.15-0.03 MG per tablet    SEASONALE    91 tablet    Take 1 tablet by mouth daily    Encounter for surveillance of contraceptive pills       spacer/aero-hold chamber mask Seema     1 Device    1 Device daily.    Asthma, exercise induced       ZOFRAN ODT 4 MG ODT tab   Generic drug:  ondansetron     20 tablet    Take 4-8 mg by mouth every 8 hours as needed for nausea Reported on 3/21/2017    Gastroenteritis

## 2018-08-07 ENCOUNTER — PRE VISIT (OUTPATIENT)
Dept: DERMATOLOGY | Facility: CLINIC | Age: 23
End: 2018-08-07

## 2018-08-08 ENCOUNTER — OFFICE VISIT (OUTPATIENT)
Dept: DERMATOLOGY | Facility: CLINIC | Age: 23
End: 2018-08-08
Payer: COMMERCIAL

## 2018-08-08 DIAGNOSIS — L91.0 HYPERTROPHIC SCAR: ICD-10-CM

## 2018-08-08 DIAGNOSIS — L90.5 SCAR: Primary | ICD-10-CM

## 2018-08-08 ASSESSMENT — PAIN SCALES - GENERAL
PAINLEVEL: NO PAIN (0)
PAINLEVEL: NO PAIN (0)

## 2018-08-08 NOTE — MR AVS SNAPSHOT
After Visit Summary   8/8/2018    Mayi Aleman    MRN: [id number]           Patient Information     Date Of Birth          1995        Visit Information        Provider Department      8/8/2018 8:30 AM Carin Melendez MD [facility]        Care Instructions    Pulse Dye Laser    I will experience redness, swelling, pain, and heat sensation. I may experience bruising, itching, or acne. Risks are blistering, oozing, permanent scarring, hair loss,  temporary or permanent skin lightening or darkening, infection, and eye injury. I understand my outcome could be no improvement, slight improvement. Multiple treatments may be required.    After treatment, Do Not:    Rub, scratch, or put weight on the site for 2 weeks    Wear tight fitting clothing or jewelry over the site    Alonzo. Keep the site out of sunlight. Use sunscreen of 30 SPF or greater when in the sun. Use sunscreen 30 minutes before going out and reapply if sweating. Tanning decreases the success of the treatment     How do I care for the treated site?    Use ice packs for 10 minutes after the procedure for swelling     If the site is on your face, use ice again 1 hour after treatment    If a scab or crust forms, gently cleanse the site with hydrogen peroxide. Then put on Vaseline  ointment 3 times a day    Do not use makeup on any open wound    What should I expect?    Blue-gray color that may take 2 to 3 weeks to go away    Redness may also last a week or longer    Results may take up to 3 or 4 months after treatment    More procedures may be needed    Who should I call with questions?    [facility]: [phone]     [facility]: [phone]    For urgent needs outside of business hours call the [facility] at [phone] and ask for the dermatology resident on call      CO2RE Laser    I will experience redness, swelling, peeling, pain, and heat sensation. I  may experience bleeding, oozing, itching, or acne. Risks are blistering, permanent scarring, temporary or permanent skin lightening or darkening, infection, and eye injury. I understand my outcome could be no improvement, slight improvement. Multiple treatments may be required.    TWO WEEKS BEFORE TREATMENT    Stop use of all Retinols   o Retin A, Tazorac,  anti-aging  products    Stop use of all glycolic acid treatments (longer if deeper peel)    Stop use of all salicylic acid products    Stop excessive sun exposure 8 weeks prior to treatment    Stop waxing    Stop abrasive scrubs    Stop microdermabrasion treatments    Stop hydroquinone(bleaching cream) 3 day sprior    Men should not shave 24 hours prior.      and bring  o Aquaphor   o Cetaphil cream  o Cetaphil gentle facial cleanser        AFTER CARE INSTRUCTIONS  - Avoid the sun    - You may start wound care after 24 hours or sooner if comfortable.    - The first 3 days, start soaks, twice daily, soak for a few minutes with a clean cloth saturated with a dilute vinegar solution to help prevent infection.    Mix 2 tablespoons of household white vinegar in 2 cups of water.    Prepare fresh each day     Apply  Aquaphor to protect and soften the peeling skin.   - After 3 days, cleanse the face water with water and a mild/gentle cleanser (i.e. Vanicream facial cleanser or Cetaphil facial cleanserproducts) once daily. Then, apply a thin layer of Cetaphil cream several times a day for healing and comfort.Once the skin sloughing and flaking is over, ointment (Aquaphor and epidermal repair cream) is no longer needed  - Once sloughing and flaking is over sunblock  must be applied. Resume routine skin care and use of cosmetics as tolerated   - In some cases use of a bleaching cream may be recommended to start as well.  - Men may begin using an electric razor if they prefer to shave after 1 week.   - Apply sunblock every day and reapply every 2 hours when outside.  Sun exposure can cause dark brown discoloration.    Reapply several times per day every day regardless of weather or indoors.    Use sunblock for face, SPF at least 30, broad spectrum (UVA & UVB)    Use diligently at least 3 months and avoid direct sun exposure.    Use a broad-rim hat if outdoors for a long time.      Who should I call with questions?       [facility]: [phone]       [facility]: [phone]       For urgent needs outside of business hours call the [facility] at [phone]        and ask for the dermatology resident on call                      Follow-ups after your visit        Your next 10 appointments already scheduled     Aug 17, 2018  3:45 PM CDT   Return Visit with Carin Melendez MD   [facility]    [address] [place] [address] [phone]            Sep 26, 2018  8:30 AM CDT   (Arrive by 8:15 AM)   Return Visit with Carin Melendez MD   [facility] ([facility])    [address] [place] [address] [phone]              Who to contact     Please call your clinic at [phone] to:    Ask questions about your health    Make or cancel appointments    Discuss your medicines    Learn about your test results    Speak to your doctor            Additional Information About Your Visit        Blue Medor[employer]art Information     D[employer]ation gives you secure access to your electronic health record. If you see a primary care provider, you can also send messages to your care team and make appointments. If you have questions, please call your primary care clinic.  If you do not have a primary care provider, please call [phone] and they will assist you.      D[employer]ation is an electronic gateway that provides easy, online access to your medical records. With [employer]on[employer], you can request a clinic appointment, read your test  results, renew a prescription or communicate with your care team.     To access your existing account, please contact your [facility] or call [phone] for assistance.        Care EveryWhere ID     This is your Care EveryWhere ID. This could be used by other organizations to access your [place] medical records  [id number]         Blood Pressure from Last 3 Encounters:   05/18/18 119/78   09/26/17 124/80   03/21/17 112/72    Weight from Last 3 Encounters:   05/18/18 65.5 kg (144 lb 6.4 oz)   09/26/17 62.2 kg (137 lb 3.2 oz)   03/21/17 60.2 kg (132 lb 12.8 oz)              Today, you had the following     No orders found for display       Primary Care Provider Office Phone # Fax #    Yisel Benson [phone] [address] [address]        Equal Access to Services     [facility] : Hadii aad ku hadasho Soomaali, waaxda luqadaha, qaybta kaalmada adeegyada, waxay vamshiin hayhernann elizabeth castano . So [facility] [phone].    ATENCIÓN: Si habla español, tiene a martino disposición servicios gratuitos de asistencia lingüística. Llame al [phone].    We comply with applicable federal civil rights laws and [place] laws. We do not discriminate on the basis of race, color, national origin, age, disability, sex, sexual orientation, or gender identity.            Thank you!     Thank you for choosing [facility]  for your care. Our goal is always to provide you with excellent care. Hearing back from our patients is one way we can continue to improve our services. Please take a few minutes to complete the written survey that you may receive in the mail after your visit with us. Thank you!             Your Updated Medication List - Protect others around you: Learn how to safely use, store and throw away your medicines at www.disposemymeds.org.          This list is accurate as of 8/8/18 10:17 AM.  Always use your most recent med list.                    Brand Name Dispense Instructions for use Diagnosis    albuterol 90 MCG/ACT inhaler     1 Inhaler    Inhale 2 puffs into the lungs every 6 hours as needed for shortness of breath / dyspnea.    Asthma, exercise induced       levonorgestrel-ethinyl estradiol 0.15-0.03 MG per tablet    SEASONALE    91 tablet    Take 1 tablet by mouth daily    Encounter for surveillance of contraceptive pills       spacer/aero-hold chamber mask Seema     1 Device    1 Device daily.    Asthma, exercise induced       ZOFRAN ODT 4 MG ODT tab   Generic drug:  ondansetron     20 tablet    Take 4-8 mg by mouth every 8 hours as needed for nausea Reported on 3/21/2017    Gastroenteritis

## 2018-08-08 NOTE — NURSING NOTE
Dermatology Laser Intake Checklist:  History of psoriasis:No  History of recent tan, indoor or outdoor tanning/vacation or other sun exposure: No  History of vitiligo:No  Family history of vitiligo:No  Recent other cosmetic procedure(microderm abrasion/peel/hair removal/facial etc):No  History of HSV:No   Did the patient start valtrex: No  Tattoo in the area to be treated:No  Is patient using hydroquinone:No  Retinoids and other acne medications stopped for 2 weeks:No  Has the patient had accutane in the last 6-12 months:No  Pregnant or breastfeeding: No  History of skin cancer in area planned for treatment: No  History of treatment with gold:No  Changes in medical history: No  Photos obtained: Yes  Does the patient smoke:No  Is the patient on ibuprofen/aspirin/plavix/coumadin/other blood thinner: No  If patient is taking narcotic or diazepam(valium)-does patient have : No  There were no vitals taken for this visit.

## 2018-08-08 NOTE — NURSING NOTE
Drug Administration Record    Drug Name: triamcinolone acetonide(kenalog)  Dose: 1mL of triamcinolone 10mg/mL, 10mg dose  Route administered: ID  NDC #: Kenalog-10 (4420-9310-41)  Amount of waste(mL):4  Reason for waste: Single use vial    LOT #: [id number]  SITE: BODY  : [employer]  EXPIRATION DATE: FEB2020

## 2018-08-08 NOTE — PROGRESS NOTES
Laser- VBeam(Pulsed Dye Laser) Procedure Note: Medical    Procedure Date: Aug 8, 2018    Attending Staff Surgeon: Carin Melendez MD    Resident Surgeon: Ananya Phelps MD and Darnell Long MD    Assistant: Brittni Chapa RN    Operating Room Data:     Surgery/Procedure Date:    SAME     Pre-operative Diagnosis:   Scar  Location: Right upper back  Number of lesions: 1    Operation/Procedure    Vbeam pulsed dye laser treatment#: 1      Post-operative Diagnosis:  SAME    Laser Settings:  Energy:4.5 J/cm2  Spot size:7mm  Pulse width:  1.5 mS (0.45 thru 40 mS)  Dynamic cooling spray settin mS  Dynamic cooling device delay:  20 mS      Anesthesia:  None    Description of Operation/Procedure:   The nature and purpose of the procedure, associated risks, possible consequences, complications and alternative methods of treatment were explained in detail, this includes but is not limited to hyperpigmentation, hypopigmentation, scarring, bruising, hair loss pain/discomfort, eye injury, and blister.   We reviewed that the outcome could be any of the following: no improvement, slight improvement or change in skin color & texture, the skin might be permanently lighter or darker, and though uncommon, superficial scarring may occur.  Multiple treatments may be recommended.   A photo and operative consent were obtained. Time-out was performed.  The patient was positioned to optimally expose the area treated.  Protective eyewear was worn by the patient and goggles on all personnel in the treatment room.  The patient confirmed the site to be treated. The laser energy output was verified by meter reading.      The clinically evident lesion(s) was/were treated with Ceci Vbeam pulsed dye laser (595 nm) beam as above.  A total of  8 pulses were used.  The patient tolerated the procedure well and no complications were noted. Post operative instructions were provided. The total laser operation and preparation time was 5 minutes.       The patient will follow-up in 6-8 weeks    Dr. Melendez staffed the patient was present for the entire procedure.        Staff Involved:  Dr. Al CONWAY Procedure: Medical    Procedure Date: 08/08/2018  Staff: Carin Hudson MD  Resident/Fellow:  Ananya Phelps MD    Procedure:   CO2RE, fractional CO2 ablative laser treatment #: 1st treatment, single pass, 2 areas  Approximate treatment area:  2.6x1.2 (chest), 2.6x1.4 (back)  Approximate length of treatment: 10 minutes    Anesthesia and Premedication:  Anesthesia (topical): Benzocaine 20%/Lidocaine 8%/Tetracaine 4% ointment      Device Settings:  Diagnosis:   Location: right upper back and anterior chest  Mode(class/deep/mid/etc): deep  Frational coverage(%): 3% (chest and back)  Core(mJ): 30 (chest), 70 (back)      Description of Procedure:   The nature and purpose of the procedure, associated risks, possible consequences, complications, and alternative methods of treatment were explained in detail including but not limited to redness, swelling, peeling of the skin, eye injury, infection, bleeding, oozing, heat sensation, itching or acne.  Possible outcomes were reviewed including the following: no improvement, slight improvement, skin lightening or darkening. The possibility of permanent scarring was reviewed. Discussion that multiple treatments may be required was completed.     Photo consent was obtained and reviewed, a time out was performed, and informed consent was obtained.  Protective eyewear was worn by the patient and all personnel in the treatment room  The patient confirmed the site to be treated. The laser energy output was verified by meter reading. The [employer] cooler and smoke evacuator devices were operated coincident with the CO2RE laser. The areas were treated with CO2RE laser as described. Kenalog 10 (0.2)was massaged into scar.  The patient tolerated the procedure well and no complications were noted. Verbal and written aftercare instructions  were provided. Post procedure care including use of mild, gentle cleansers and moisturizers  for the first week following treatment was reviewed. The patient was recommend application of at least SPF 30 sunscreen daily  and avoidance of direct sunlight up to 3 months post procedure.  The patient was discharged from the dermatology clinic in good condition.    The patient will follow up with nursing in 48 hours and with MD in 6-8 weeks.     Staff Involved:  Resident(Ananya Phelps)/Staff(Carin Melendez)      Ananya Phelps MD   resident, PGY2         Picture placed in chart for future reference.     Staff Physician Comments:   I saw and evaluated the patient with the resident and I agree with the assessment and plan.  I was present for the examination and both procedures.    Carin Melendez MD    Department of Dermatology  [facility]: Phone: [phone], Fax:[phone]  [facility]: Phone: [phone], Fax: [phone]

## 2018-08-08 NOTE — PATIENT INSTRUCTIONS
Pulse Dye Laser    I will experience redness, swelling, pain, and heat sensation. I may experience bruising, itching, or acne. Risks are blistering, oozing, permanent scarring, hair loss,  temporary or permanent skin lightening or darkening, infection, and eye injury. I understand my outcome could be no improvement, slight improvement. Multiple treatments may be required.    After treatment, Do Not:    Rub, scratch, or put weight on the site for 2 weeks    Wear tight fitting clothing or jewelry over the site    Alonzo. Keep the site out of sunlight. Use sunscreen of 30 SPF or greater when in the sun. Use sunscreen 30 minutes before going out and reapply if sweating. Tanning decreases the success of the treatment     How do I care for the treated site?    Use ice packs for 10 minutes after the procedure for swelling     If the site is on your face, use ice again 1 hour after treatment    If a scab or crust forms, gently cleanse the site with hydrogen peroxide. Then put on Vaseline  ointment 3 times a day    Do not use makeup on any open wound    What should I expect?    Blue-gray color that may take 2 to 3 weeks to go away    Redness may also last a week or longer    Results may take up to 3 or 4 months after treatment    More procedures may be needed    Who should I call with questions?    [facility]: [phone]     [facility]: [phone]    For urgent needs outside of business hours call the [facility] at [phone] and ask for the dermatology resident on call      CO2RE Laser    I will experience redness, swelling, peeling, pain, and heat sensation. I may experience bleeding, oozing, itching, or acne. Risks are blistering, permanent scarring, temporary or permanent skin lightening or darkening, infection, and eye injury. I understand my outcome could be no improvement, slight improvement. Multiple treatments may be required.    TWO WEEKS  BEFORE TREATMENT    Stop use of all Retinols   o Retin A, Tazorac,  anti-aging  products    Stop use of all glycolic acid treatments (longer if deeper peel)    Stop use of all salicylic acid products    Stop excessive sun exposure 8 weeks prior to treatment    Stop waxing    Stop abrasive scrubs    Stop microdermabrasion treatments    Stop hydroquinone(bleaching cream) 3 day sprior    Men should not shave 24 hours prior.      and bring  o Aquaphor   o Cetaphil cream  o Cetaphil gentle facial cleanser        AFTER CARE INSTRUCTIONS  - Avoid the sun    - You may start wound care after 24 hours or sooner if comfortable.    - The first 3 days, start soaks, twice daily, soak for a few minutes with a clean cloth saturated with a dilute vinegar solution to help prevent infection.    Mix 2 tablespoons of household white vinegar in 2 cups of water.    Prepare fresh each day     Apply  Aquaphor to protect and soften the peeling skin.   - After 3 days, cleanse the face water with water and a mild/gentle cleanser (i.e. Vanicream facial cleanser or Cetaphil facial cleanserproducts) once daily. Then, apply a thin layer of Cetaphil cream several times a day for healing and comfort.Once the skin sloughing and flaking is over, ointment (Aquaphor and epidermal repair cream) is no longer needed  - Once sloughing and flaking is over sunblock  must be applied. Resume routine skin care and use of cosmetics as tolerated   - In some cases use of a bleaching cream may be recommended to start as well.  - Men may begin using an electric razor if they prefer to shave after 1 week.   - Apply sunblock every day and reapply every 2 hours when outside. Sun exposure can cause dark brown discoloration.    Reapply several times per day every day regardless of weather or indoors.    Use sunblock for face, SPF at least 30, broad spectrum (UVA & UVB)    Use diligently at least 3 months and avoid direct sun exposure.    Use a broad-rim hat if  outdoors for a long time.      Who should I call with questions?       [facility]: [phone]       [facility]: [phone]       For urgent needs outside of business hours call the [facility] at [phone]        and ask for the dermatology resident on call

## 2018-08-08 NOTE — LETTER
2018       RE: Mayi Aleman  [address] [facility] [address]     Dear Colleague,    Thank you for referring your patient, Mayi Aleman, to the [facility] DERMATOLOGY at [facility]. Please see a copy of my visit note below.    Laser- VBeam(Pulsed Dye Laser) Procedure Note: Medical    Procedure Date: Aug 8, 2018    Attending Staff Surgeon: Carin Melendez MD    Resident Surgeon: Ananya Phelps MD and Darnell Long MD    Assistant: Brittni Chapa RN    Operating Room Data:     Surgery/Procedure Date:    SAME     Pre-operative Diagnosis:   Scar  Location: Right upper back  Number of lesions: 1    Operation/Procedure    Vbeam pulsed dye laser treatment#: 1      Post-operative Diagnosis:  SAME    Laser Settings:  Energy:4.5 J/cm2  Spot size:7mm  Pulse width:  1.5 mS (0.45 thru 40 mS)  Dynamic cooling spray settin mS  Dynamic cooling device delay:  20 mS      Anesthesia:  None    Description of Operation/Procedure:   The nature and purpose of the procedure, associated risks, possible consequences, complications and alternative methods of treatment were explained in detail, this includes but is not limited to hyperpigmentation, hypopigmentation, scarring, bruising, hair loss pain/discomfort, eye injury, and blister.   We reviewed that the outcome could be any of the following: no improvement, slight improvement or change in skin color & texture, the skin might be permanently lighter or darker, and though uncommon, superficial scarring may occur.  Multiple treatments may be recommended.   A photo and operative consent were obtained. Time-out was performed.  The patient was positioned to optimally expose the area treated.  Protective eyewear was worn by the patient and goggles on all personnel in the treatment room.  The patient confirmed the site to be treated. The laser energy output was verified by meter reading.      The clinically evident lesion(s)  was/were treated with Ceci Vbeam pulsed dye laser (595 nm) beam as above.  A total of  8 pulses were used.  The patient tolerated the procedure well and no complications were noted. Post operative instructions were provided. The total laser operation and preparation time was 5 minutes.      The patient will follow-up in 6-8 weeks    Dr. Melendez staffed the patient was present for the entire procedure.        Staff Involved:  Dr. Al CONWAY Procedure: Medical    Procedure Date: 08/08/2018  Staff: Carin Hudson MD  Resident/Fellow:  Ananya Phelps MD    Procedure:   CO2RE, fractional CO2 ablative laser treatment #: 1st treatment, single pass, 2 areas  Approximate treatment area:  2.6x1.2 (chest), 2.6x1.4 (back)  Approximate length of treatment: 10 minutes    Anesthesia and Premedication:  Anesthesia (topical): Benzocaine 20%/Lidocaine 8%/Tetracaine 4% ointment      Device Settings:  Diagnosis:   Location: right upper back and anterior chest  Mode(class/deep/mid/etc): deep  Frational coverage(%): 3% (chest and back)  Core(mJ): 30 (chest), 70 (back)      Description of Procedure:   The nature and purpose of the procedure, associated risks, possible consequences, complications, and alternative methods of treatment were explained in detail including but not limited to redness, swelling, peeling of the skin, eye injury, infection, bleeding, oozing, heat sensation, itching or acne.  Possible outcomes were reviewed including the following: no improvement, slight improvement, skin lightening or darkening. The possibility of permanent scarring was reviewed. Discussion that multiple treatments may be required was completed.     Photo consent was obtained and reviewed, a time out was performed, and informed consent was obtained.  Protective eyewear was worn by the patient and all personnel in the treatment room  The patient confirmed the site to be treated. The laser energy output was verified by meter reading. The [employer]  cooler and smoke evacuator devices were operated coincident with the CO2RE laser. The areas were treated with CO2RE laser as described. Kenalog 10 (0.2)was massaged into scar.  The patient tolerated the procedure well and no complications were noted. Verbal and written aftercare instructions were provided. Post procedure care including use of mild, gentle cleansers and moisturizers  for the first week following treatment was reviewed. The patient was recommend application of at least SPF 30 sunscreen daily  and avoidance of direct sunlight up to 3 months post procedure.  The patient was discharged from the dermatology clinic in good condition.    The patient will follow up with nursing in 48 hours and with MD in 6-8 weeks.     Staff Involved:  Resident(Ananya Phelps)/Staff(Carin Melendez)      Ananya Phelps MD   resident, PGY2       Staff Physician Comments:   I saw and evaluated the patient with the resident and I agree with the assessment and plan.  I was present for the examination and both procedures.    Carin Melendez MD    Department of Dermatology  [facility]: Phone: [phone], Fax:[phone]  [facility]: Phone: [phone], Fax: [phone]

## 2018-08-08 NOTE — NURSING NOTE
Right chest and right shoulder cooled with murtaza. Vaseline and telfa applied. Atfercare reviewed.       Brittni Chapa RN

## 2018-08-08 NOTE — NURSING NOTE
Dermatology Rooming Note    Mayi Aleman's goals for this visit include:   Chief Complaint   Patient presents with     Laser Treatment     Mayi is visiting for laser treatment for the right shoulder     Latoya Millard LPN

## 2018-08-17 ENCOUNTER — OFFICE VISIT (OUTPATIENT)
Dept: DERMATOLOGY | Facility: CLINIC | Age: 23
End: 2018-08-17
Payer: COMMERCIAL

## 2018-08-17 DIAGNOSIS — D22.9 NEVUS: Primary | ICD-10-CM

## 2018-08-17 PROCEDURE — 99212 OFFICE O/P EST SF 10 MIN: CPT | Performed by: DERMATOLOGY

## 2018-08-17 ASSESSMENT — PAIN SCALES - GENERAL: PAINLEVEL: NO PAIN (0)

## 2018-08-17 NOTE — MR AVS SNAPSHOT
After Visit Summary   8/17/2018    Mayi Aleman    MRN: [id number]           Patient Information     Date Of Birth          1995        Visit Information        Provider Department      8/17/2018 3:45 PM Carin Melendez MD [facility]        Today's Diagnoses     Nevus    -  1       Follow-ups after your visit        Your next 10 appointments already scheduled     Sep 26, 2018  8:30 AM CDT   (Arrive by 8:15 AM)   Return Visit with Carin Melendez MD   [facility] Dermatology ([facility] [place])    [address] [place] [address]  3rd [facility] [address] [phone]              Who to contact     If you have questions or need follow up information about today's clinic visit or your schedule please contact [facility] directly at [phone].  Normal or non-critical lab and imaging results will be communicated to you by [employer], letter or phone within 4 business days after the clinic has received the results. If you do not hear from us within 7 days, please contact the clinic through MyChart or phone. If you have a critical or abnormal lab result, we will notify you by phone as soon as possible.  Submit refill requests through [employer] or call your pharmacy and they will forward the refill request to us. Please allow 3 business days for your refill to be completed.          Additional Information About Your Visit        MyChart Information     [employer] gives you secure access to your electronic health record. If you see a primary care provider, you can also send messages to your care team and make appointments. If you have questions, please call your primary care clinic.  If you do not have a primary care provider, please call [phone] and they will assist you.      [employer] is an electronic gateway that provides easy, online access to your medical records. With [employer], you can request a clinic appointment, read your test results, renew a  prescription or communicate with your care team.     To access your existing account, please contact your [facility] or call [phone] for assistance.        Care EveryWhere ID     This is your Care EveryWhere ID. This could be used by other organizations to access your [facility] medical records  [id number]         Blood Pressure from Last 3 Encounters:   05/18/18 119/78   09/26/17 124/80   03/21/17 112/72    Weight from Last 3 Encounters:   05/18/18 65.5 kg (144 lb 6.4 oz)   09/26/17 62.2 kg (137 lb 3.2 oz)   03/21/17 60.2 kg (132 lb 12.8 oz)              Today, you had the following     No orders found for display       Primary Care Provider Office Phone # Fax #    Yisel Benson [phone] [address] [address]        Equal Access to Services     ANTON [place]PT[place] : Hadii aad ku hadasho Soomaali, waaxda luqadaha, qaybta kaalmada adeegyada, waxay vamshiin haysydnie castano . So [facility] [phone].    ATENCIÓN: Si habla español, tiene a martino disposición servicios gratuitos de asistencia lingüística. Llame al [phone].    We comply with applicable federal civil rights laws and [place] laws. We do not discriminate on the basis of race, color, national origin, age, disability, sex, sexual orientation, or gender identity.            Thank you!     Thank you for choosing [facility]  for your care. Our goal is always to provide you with excellent care. Hearing back from our patients is one way we can continue to improve our services. Please take a few minutes to complete the written survey that you may receive in the mail after your visit with us. Thank you!             Your Updated Medication List - Protect others around you: Learn how to safely use, store and throw away your medicines at www.disposemymeds.org.          This list is accurate as of 8/17/18  4:23 PM.  Always use your most recent med list.                    Brand Name Dispense Instructions for use Diagnosis    albuterol 90 MCG/ACT inhaler     1 Inhaler    Inhale 2 puffs into the lungs every 6 hours as needed for shortness of breath / dyspnea.    Asthma, exercise induced       levonorgestrel-ethinyl estradiol 0.15-0.03 MG per tablet    SEASONALE    91 tablet    Take 1 tablet by mouth daily    Encounter for surveillance of contraceptive pills       spacer/aero-hold chamber mask Seema     1 Device    1 Device daily.    Asthma, exercise induced       ZOFRAN ODT 4 MG ODT tab   Generic drug:  ondansetron     20 tablet    Take 4-8 mg by mouth every 8 hours as needed for nausea Reported on 3/21/2017    Gastroenteritis

## 2018-08-17 NOTE — NURSING NOTE
Mayi Aleman's goals for this visit include:   Chief Complaint   Patient presents with     Spot check     Spot on lower back to check. Pt not concerned. No other areas of concern. Family Hx of Skin cancer.        She requests these members of her care team be copied on today's visit information:     PCP: Yisel Benson    Referring Provider:  No referring provider defined for this encounter.    There were no vitals taken for this visit.    Do you need any medication refills at today's visit? No.    Elaine Casper LPN

## 2018-08-17 NOTE — PROGRESS NOTES
[facility] Dermatology Note      Dermatology Problem List:  1. Consistent with pigmented spindle cell nevus, right upper back  -s/p excision 8/11/2015  2. History of dysplastic nevi  -compound nevus with moderate dysplastic on th left buttock 6/2017  -Compound dysplastic nevus with moderate to severe atypia, right posterior shoulder, s/p excision 10/6/2016  -Compound dysplastic nevus with moderate to severe atypia, right chest, s/p excision 8/11/2015    Encounter Date: Aug 17, 2018    CC:  Chief Complaint   Patient presents with     Spot check     Spot on lower back to check. Pt not concerned. No other areas of concern. Family Hx of Skin cancer.          History of Present Illness:  Ms. Mayi Aleman is a 23 year old female who presents for follow up for spot check for a lesion on the right lower paraspinal back. She was last seen in dermatology for a CORE laser procedure on 8/8/18. Today, the pt says she is not concerned with this lesion on the back - has not noticed anything. No other skin concerns.     Past Medical History:   Patient Active Problem List   Diagnosis     Contraception     Exercise-induced asthma     Intermittent asthma     Family history of malignant neoplasm of breast     Past Medical History:   Diagnosis Date     Exercise-induced asthma 3/4/2011     Past Surgical History:   Procedure Laterality Date     APPENDECTOMY  12/08/06    Laparoscopic      BIOPSY OF SKIN LESION       COLONOSCOPY  1/28/2013    Procedure: COLONOSCOPY;  Colonoscopy;  Surgeon: Jamison Christianson MD;  Location:  GI     Social History:  Social History     Social History     Marital status: Single     Spouse name: N/A     Number of children: N/A     Years of education: N/A     Occupational History     Not on file.     Social History Main Topics     Smoking status: Never Smoker     Smokeless tobacco: Never Used      Comment: Mom smokes outside     Alcohol use No     Drug use: No     Sexual activity: Yes      Partners: Male     Birth control/ protection: Pill     Other Topics Concern     Parent/Sibling W/ Cabg, Mi Or Angioplasty Before 65f 55m? No     Social History Narrative       Pt is very sad looking, I wonder if this is related to mom who during the whole visit was very gruff and had a     passive aggressive nature.  Mom works as a nurse manager for [employer] in [employer] [facility].     The patient works as a nurse at [employer]. The patient does not smoke.   Kept in chart for convenience.       Family History:  Family History   Problem Relation Age of Onset     Asthma Mother      Hypertension Maternal Grandmother      Lipids Maternal Grandmother      Asthma Maternal Grandmother      Skin Cancer Maternal Grandmother      Cancer Paternal Grandfather      skin     Melanoma No family hx of      There is a family history of possible melanoma skin cancer, grandfather.  There is a family history of asthma.  There is no family history of hay fever, psoriasis or eczema.   Kept in chart for convenience.       Medications:  Current Outpatient Prescriptions   Medication Sig Dispense Refill     levonorgestrel-ethinyl estradiol (SEASONALE) 0.15-0.03 MG per tablet Take 1 tablet by mouth daily 91 tablet 4     albuterol 90 MCG/ACT inhaler Inhale 2 puffs into the lungs every 6 hours as needed for shortness of breath / dyspnea. (Patient not taking: Reported on 8/17/2018) 1 Inhaler 12     ondansetron (ZOFRAN ODT) 4 MG ODT tab Take 4-8 mg by mouth every 8 hours as needed for nausea Reported on 3/21/2017 20 tablet 1     Spacer/Aero-Holding Chambers GRISELDA 1 Device daily. (Patient not taking: Reported on 8/8/2018) 1 Device prn     Allergies   Allergen Reactions     Sulfate      Review of Systems:  -Const: The patient is generally feeling well today.   -Skin: As above in HPI. No additional skin concerns.     Physical exam:  There were no vitals taken for this visit.  GEN: This is a well developed, well-nourished female in no acute distress,  in a pleasant mood.    SKIN: Total skin excluding the undergarment areas was performed. The exam included the head/face, neck, both arms, chest, back, abdomen, both legs, digits and/or nails. Nails painted.   - Hypertrophic scar on right upper back  - On the right lower paraspinal back, there is a 1 cm pigmented patch w/ normal pigment network with focal area of erythema and grey discoloration - unchanged.   -No other lesions of concern on areas examined.     Impression/Plan:  1. History of spindle cell nevus and dysplastic nevus - not addressed    2.  Hypertrophic 2cm  scar, right upper back - stable since last visit, was treated 8/8/18 cORE/PDL    3.  Compound nevus with atypical features, right groin.     Recurrent but within normal limits at last visit 7/31/2018      6. On the right lower paraspinal back, there is a 1 cm pigmented patch w/ normal pigment network with focal area of erythema and grey discoloration. Possibly inflamed nevus, stable with decreased erythema    Stable since 7/31 per photos taken at prior appointment.     New photodocumentation obtained at this visit    We will continue to monitor this lesion.    Follow up in 9/2018    Staff Involved:  Staff/Scribe    Scribe Disclosure  I, Fam Mayorga, am serving as a scribe to document services personally performed by Dr. Carin Melendez MD, based on data collection and the provider's statements to me.     Provider Disclosure:   The documentation recorded by the scribe accurately reflects the services I personally performed and the decisions made by me.    Carin Melendez MD    Department of Dermatology  [facility]: Phone: [phone], Fax:[phone]  [facility]: Phone: [phone], Fax: [phone]

## 2018-09-25 ENCOUNTER — TELEPHONE (OUTPATIENT)
Dept: DERMATOLOGY | Facility: CLINIC | Age: 23
End: 2018-09-25

## 2018-09-25 NOTE — TELEPHONE ENCOUNTER
Writer called X2 for pre procedure check list review and early arrival reminder. Latoya Millard LPN

## 2018-09-26 ENCOUNTER — OFFICE VISIT (OUTPATIENT)
Dept: DERMATOLOGY | Facility: CLINIC | Age: 23
End: 2018-09-26

## 2018-09-26 DIAGNOSIS — L91.0 HYPERTROPHIC SCAR: Primary | ICD-10-CM

## 2018-09-26 ASSESSMENT — PAIN SCALES - GENERAL: PAINLEVEL: NO PAIN (0)

## 2018-09-26 NOTE — PROGRESS NOTES
Laser- VBeam(Pulsed Dye Laser) Procedure Note: Medical    Procedure Date: Sep 26, 2018    Attending Staff Surgeon: Carin Melendez MD    Resident Surgeon: Jesus Nunez MD     Assistant: Mohini Diane RN    Operating Room Data:     Surgery/Procedure Date:    SAME     Pre-operative Diagnosis:   Scar  Location: Right upper back and Right Chest  Number of lesions: 2    Operation/Procedure    Vbeam pulsed dye laser treatment#: 2      Post-operative Diagnosis:  SAME    Laser Settings:  Energy:4.5 J/cm2  Spot size:7mm  Pulse width:  1.5 mS (0.45 thru 40 mS)  Dynamic cooling spray settin mS  Dynamic cooling device delay:  20 mS      Anesthesia:  None    Description of Operation/Procedure:   The nature and purpose of the procedure, associated risks, possible consequences, complications and alternative methods of treatment were explained in detail, this includes but is not limited to hyperpigmentation, hypopigmentation, scarring, bruising, hair loss pain/discomfort, eye injury, and blister.   We reviewed that the outcome could be any of the following: no improvement, slight improvement or change in skin color & texture, the skin might be permanently lighter or darker, and though uncommon, superficial scarring may occur.  Multiple treatments may be recommended.   A photo and operative consent were obtained. Time-out was performed.  The patient was positioned to optimally expose the area treated.  Protective eyewear was worn by the patient and goggles on all personnel in the treatment room.  The patient confirmed the site to be treated. The laser energy output was verified by meter reading.      The clinically evident lesion(s) was/were treated with Ceci Vbeam pulsed dye laser (595 nm) beam as above.  A total of 12 pulses were used.  The patient tolerated the procedure well and no complications were noted. Post operative instructions were provided. The total laser operation and preparation time was 5 minutes.      The  patient will follow-up in 3 months     Dr. Melendez staffed the patient was present for the entire procedure.        Staff Involved:  Dr. Al CONWAY Procedure: Medical    Procedure Date: 09/26/2018  Staff: Carin Hudson MD  Resident/Fellow:  Jesus Nunez MD    Procedure:   CO2RE, fractional CO2 ablative laser treatment #: 2nd treatment, single pass, 2 areas  Approximate treatment area:  2.6x1.2 (chest), 2.6x1.4 (back)  Approximate length of treatment: 10 minutes    Anesthesia and Premedication:  Anesthesia (topical): Benzocaine 20%/Lidocaine 8%/Tetracaine 4% ointment      Device Settings:  Diagnosis:   Location: right upper back   Mode(class/deep/mid/etc): deep  Frational coverage(%): 3% (back)  Core(mJ):  70 (back)      Description of Procedure:   The nature and purpose of the procedure, associated risks, possible consequences, complications, and alternative methods of treatment were explained in detail including but not limited to redness, swelling, peeling of the skin, eye injury, infection, bleeding, oozing, heat sensation, itching or acne.  Possible outcomes were reviewed including the following: no improvement, slight improvement, skin lightening or darkening. The possibility of permanent scarring was reviewed. Discussion that multiple treatments may be required was completed.     Photo consent was obtained and reviewed, a time out was performed, and informed consent was obtained.  Protective eyewear was worn by the patient and all personnel in the treatment room  The patient confirmed the site to be treated. The laser energy output was verified by meter reading. The [employer] cooler and smoke evacuator devices were operated coincident with the CO2RE laser. The areas were treated with CO2RE laser as described. Kenalog 10 (0.1)was massaged into scar.  The patient tolerated the procedure well and no complications were noted. Verbal and written aftercare instructions were provided. Post procedure care including  use of mild, gentle cleansers and moisturizers  for the first week following treatment was reviewed. The patient was recommend application of at least SPF 30 sunscreen daily  and avoidance of direct sunlight up to 3 months post procedure.  The patient was discharged from the dermatology clinic in good condition.    The patient will follow up in 3 months.     Staff Involved:  Resident(Ananya Phelps)/Staff(Carin Melendez)      Jesus Nunez            Staff Physician Comments:   I saw and evaluated the patient with the resident and I agree with the assessment and plan.  I was present for the examination and both procedures. I performed the procedures    Carin Melendez MD    Department of Dermatology  [facility] [place]note 2[facility] Phone: [phone], Fax:[phone]  [facility]: Phone: [phone], Fax: [phone]

## 2018-09-26 NOTE — LETTER
2018       RE: Mayi Aleman  [address] [facility] [address]     Dear Colleague,    Thank you for referring your patient, Mayi Aleman, to the [facility] DERMATOLOGY at [facility]. Please see a copy of my visit note below.    erroneous    Laser- VBeam(Pulsed Dye Laser) Procedure Note: Medical    Procedure Date: Sep 26, 2018    Attending Staff Surgeon: Carin Melendez MD    Resident Surgeon: Jesus Nunez MD     Assistant: Mohini Diane RN    Operating Room Data:     Surgery/Procedure Date:    SAME     Pre-operative Diagnosis:   Scar  Location: Right upper back and Right Chest  Number of lesions: 2    Operation/Procedure    Vbeam pulsed dye laser treatment#: 2      Post-operative Diagnosis:  SAME    Laser Settings:  Energy:4.5 J/cm2  Spot size:7mm  Pulse width:  1.5 mS (0.45 thru 40 mS)  Dynamic cooling spray settin mS  Dynamic cooling device delay:  20 mS      Anesthesia:  None    Description of Operation/Procedure:   The nature and purpose of the procedure, associated risks, possible consequences, complications and alternative methods of treatment were explained in detail, this includes but is not limited to hyperpigmentation, hypopigmentation, scarring, bruising, hair loss pain/discomfort, eye injury, and blister.   We reviewed that the outcome could be any of the following: no improvement, slight improvement or change in skin color & texture, the skin might be permanently lighter or darker, and though uncommon, superficial scarring may occur.  Multiple treatments may be recommended.   A photo and operative consent were obtained. Time-out was performed.  The patient was positioned to optimally expose the area treated.  Protective eyewear was worn by the patient and goggles on all personnel in the treatment room.  The patient confirmed the site to be treated. The laser energy output was verified by meter reading.      The clinically  evident lesion(s) was/were treated with Ceci Vbeam pulsed dye laser (595 nm) beam as above.  A total of 12 pulses were used.  The patient tolerated the procedure well and no complications were noted. Post operative instructions were provided. The total laser operation and preparation time was 5 minutes.      The patient will follow-up in 3 months     Dr. Melendez staffed the patient was present for the entire procedure.        Staff Involved:  Dr. Al CONWAY Procedure: Medical    Procedure Date: 09/26/2018  Staff: Carin Hudson MD  Resident/Fellow:  Jesus Nunez MD    Procedure:   CO2RE, fractional CO2 ablative laser treatment #: 2nd treatment, single pass, 2 areas  Approximate treatment area:  2.6x1.2 (chest), 2.6x1.4 (back)  Approximate length of treatment: 10 minutes    Anesthesia and Premedication:  Anesthesia (topical): Benzocaine 20%/Lidocaine 8%/Tetracaine 4% ointment      Device Settings:  Diagnosis:   Location: right upper back   Mode(class/deep/mid/etc): deep  Frational coverage(%): 3% (back)  Core(mJ):  70 (back)      Description of Procedure:   The nature and purpose of the procedure, associated risks, possible consequences, complications, and alternative methods of treatment were explained in detail including but not limited to redness, swelling, peeling of the skin, eye injury, infection, bleeding, oozing, heat sensation, itching or acne.  Possible outcomes were reviewed including the following: no improvement, slight improvement, skin lightening or darkening. The possibility of permanent scarring was reviewed. Discussion that multiple treatments may be required was completed.     Photo consent was obtained and reviewed, a time out was performed, and informed consent was obtained.  Protective eyewear was worn by the patient and all personnel in the treatment room  The patient confirmed the site to be treated. The laser energy output was verified by meter reading. The Conrad cooler and smoke  evacuator devices were operated coincident with the CO2RE laser. The areas were treated with CO2RE laser as described. Kenalog 10 (0.1)was massaged into scar.  The patient tolerated the procedure well and no complications were noted. Verbal and written aftercare instructions were provided. Post procedure care including use of mild, gentle cleansers and moisturizers  for the first week following treatment was reviewed. The patient was recommend application of at least SPF 30 sunscreen daily  and avoidance of direct sunlight up to 3 months post procedure.  The patient was discharged from the dermatology clinic in good condition.    The patient will follow up in 3 months.     Staff Involved:  Resident(Ananya Phelps)/Staff(Carin Melendez)      Jesus Nunez            Staff Physician Comments:   I saw and evaluated the patient with the resident and I agree with the assessment and plan.  I was present for the examination and both procedures. I performed the procedures    Carin Melendez MD    Department of Dermatology  [facility]: Phone: [phone], Fax:[phone]  [facility]: Phone: [phone], Fax: [phone]           Detail Level: Zone

## 2018-09-26 NOTE — MR AVS SNAPSHOT
After Visit Summary   9/26/2018    Mayi Aleman    MRN: [id number]           Patient Information     Date Of Birth          1995        Visit Information        Provider Department      9/26/2018 8:30 AM Carin Melendez MD [facility] Dermatology        Today's Diagnoses     Hypertrophic scar    -  1      Care Instructions    Pulse Dye Laser    I will experience redness, swelling, pain, and heat sensation. I may experience bruising, itching, or acne. Risks are blistering, oozing, permanent scarring, hair loss,  temporary or permanent skin lightening or darkening, infection, and eye injury. I understand my outcome could be no improvement, slight improvement. Multiple treatments may be required.    After treatment, Do Not:    Rub, scratch, or put weight on the site for 2 weeks    Wear tight fitting clothing or jewelry over the site    Alonzo. Keep the site out of sunlight. Use sunscreen of 30 SPF or greater when in the sun. Use sunscreen 30 minutes before going out and reapply if sweating. Tanning decreases the success of the treatment     How do I care for the treated site?    Use ice packs for 10 minutes after the procedure for swelling     If the site is on your face, use ice again 1 hour after treatment    If a scab or crust forms, gently cleanse the site with hydrogen peroxide. Then put on Vaseline  ointment 3 times a day    Do not use makeup on any open wound    What should I expect?    Blue-gray color that may take 2 to 3 weeks to go away    Redness may also last a week or longer    Results may take up to 3 or 4 months after treatment    More procedures may be needed    Who should I call with questions?    [facility]: [phone]     [facility]: [phone]    For urgent needs outside of business hours call the [facility] at [phone] and ask for the dermatology resident on call      CO2RE Laser    I will experience  redness, swelling, peeling, pain, and heat sensation. I may experience bleeding, oozing, itching, or acne. Risks are blistering, permanent scarring, temporary or permanent skin lightening or darkening, infection, and eye injury. I understand my outcome could be no improvement, slight improvement. Multiple treatments may be required.    TWO WEEKS BEFORE TREATMENT    Stop use of all Retinols   o Retin A, Tazorac,  anti-aging  products    Stop use of all glycolic acid treatments (longer if deeper peel)    Stop use of all salicylic acid products    Stop excessive sun exposure 8 weeks prior to treatment    Stop waxing    Stop abrasive scrubs    Stop microdermabrasion treatments    Stop hydroquinone(bleaching cream) 3 day sprior    Men should not shave 24 hours prior.      and bring  o Aquaphor   o Cetaphil cream  o Cetaphil gentle facial cleanser        AFTER CARE INSTRUCTIONS  -     - Avoid the sun    - You may start wound care after 24 hours or sooner if comfortable.    - The first 3 days, start soaks, twice daily, soak for a few minutes with a clean cloth saturated with a dilute vinegar solution to help prevent infection.    Mix 2 tablespoons of household white vinegar in 2 cups of water.    Prepare fresh each day     Apply  Aquaphor to protect and soften the peeling skin.   - After 3 days, cleanse the face water with water and a mild/gentle cleanser (i.e. Vanicream facial cleanser or Cetaphil facial cleanserproducts) once daily. Then, apply a thin layer of Cetaphil cream several times a day for healing and comfort.Once the skin sloughing and flaking is over, ointment (Aquaphor and epidermal repair cream) is no longer needed  - One sloughing and flaking is over sunblock  must be applied. Resume routine skin care and use of cosmetics as tolerated   - In some cases use of a bleaching cream may be recommended to start as well.  - Men may begin using an electric razor if they prefer to shave after 1 week.   - Apply  sunblock every day and reapply every 2 hours when outside. Sun exposure can cause dark brown discoloration.    Reapply several times per day every day regardless of weather or indoors.    Use sunblock for face, SPF at least 30, broad spectrum (UVA & UVB)    Use diligently at least 3 months and avoid direct sun exposure.    Use a broad-rim hat if outdoors for a long time.      Who should I call with questions?       [facility]: [phone]       [facility]: [phone]       For urgent needs outside of business hours call the [facility] at [phone]        and ask for the dermatology resident on call                        Follow-ups after your visit        Follow-up notes from your care team     Return in about 3 months (around 12/26/2018) for CO2 and pdl of scar.      Your next 10 appointments already scheduled     Jan 09, 2019  9:45 AM CST   (Arrive by 9:30 AM)   Laser Visit with Carin Melendez MD   [facility] Dermatology ([facility])    [address] [place] [id number] [phone]              Who to contact     Please call your clinic at [phone] to:    Ask questions about your health    Make or cancel appointments    Discuss your medicines    Learn about your test results    Speak to your doctor            Additional Information About Your Visit        [employer] Information     [employer] gives you secure access to your electronic health record. If you see a primary care provider, you can also send messages to your care team and make appointments. If you have questions, please call your primary care clinic.  If you do not have a primary care provider, please call [phone] and they will assist you.      [employer] is an electronic gateway that provides easy, online access to your medical records. With [employer], you can request a clinic appointment, read your test results, renew a  prescription or communicate with your care team.     To access your existing account, please contact your [facility] or call [phone] for assistance.        Care EveryWhere ID     This is your Care EveryWhere ID. This could be used by other organizations to access your [place] medical records  [id number]         Blood Pressure from Last 3 Encounters:   05/18/18 119/78   09/26/17 124/80   03/21/17 112/72    Weight from Last 3 Encounters:   05/18/18 65.5 kg (144 lb 6.4 oz)   09/26/17 62.2 kg (137 lb 3.2 oz)   03/21/17 60.2 kg (132 lb 12.8 oz)              We Performed the Following     SKIN TISSUE PROCEDURE UNLISTED        Primary Care Provider Office Phone # Fax #    Yisel Benson [phone] [address] [facility] [place] [address]        Equal Access to Services     LEONEL HAMPTON : Hadii aad ku hadasho Soraissa, waaxda luqadaha, qaybta kaalmada adeegyada, sebastien castano . So [place] -[phone].    ATENCIÓN: Si thaliala español, tiene a martino disposición servicios gratuitos de asistencia lingüística. Llame al [phone].    We comply with applicable federal civil rights laws and [place] laws. We do not discriminate on the basis of race, color, national origin, age, disability, sex, sexual orientation, or gender identity.            Thank you!     Thank you for choosing [facility] DERMATOLOGY  for your care. Our goal is always to provide you with excellent care. Hearing back from our patients is one way we can continue to improve our services. Please take a few minutes to complete the written survey that you may receive in the mail after your visit with us. Thank you!             Your Updated Medication List - Protect others around you: Learn how to safely use, store and throw away your medicines at www.disposemymeds.org.          This list is accurate as of 9/26/18  9:22 AM.  Always use your most recent med list.                    Brand Name Dispense Instructions for use Diagnosis    albuterol 90 MCG/ACT inhaler     1 Inhaler    Inhale 2 puffs into the lungs every 6 hours as needed for shortness of breath / dyspnea.    Asthma, exercise induced       levonorgestrel-ethinyl estradiol 0.15-0.03 MG per tablet    SEASONALE    91 tablet    Take 1 tablet by mouth daily    Encounter for surveillance of contraceptive pills       spacer/aero-hold chamber mask Seema     1 Device    1 Device daily.    Asthma, exercise induced       ZOFRAN ODT 4 MG ODT tab   Generic drug:  ondansetron     20 tablet    Take 4-8 mg by mouth every 8 hours as needed for nausea Reported on 3/21/2017    Gastroenteritis

## 2018-09-26 NOTE — NURSING NOTE
BLT cream applied topically by Alem Wlesh LPN at 0830.  [id number]  MFG-8/14/18  EXp-10/13/2018  [facility] [address]

## 2018-09-26 NOTE — NURSING NOTE
Drug Administration Record    Drug Name: triamcinolone acetonide(kenalog)  Dose:0.1 mL of triamcinolone 10mg/mL, 10mg dose  Route administered: ID  NDC #: Kenalog-10 (9157-3927-71)  Amount of waste(mL):4.9  Reason for waste: Single use vial    LOT #: [id number]  SITE: see note  : [employer]  EXPIRATION DATE: 4/2020

## 2018-09-26 NOTE — NURSING NOTE
Dermatology Laser Intake Checklist:  History of psoriasis:No  History of recent tan, indoor or outdoor tanning/vacation or other sun exposure: No  History of vitiligo:No  Family history of vitiligo:No  Recent other cosmetic procedure(microderm abrasion/peel/hair removal/facial etc):No  History of HSV:No   Did the patient start valtrex: No  Tattoo in the area to be treated:No  Is patient using hydroquinone:No  Retinoids and other acne medications stopped for 2 weeks:No  Has the patient had accutane in the last 6-12 months:No  Pregnant or breastfeeding: No  History of skin cancer in area planned for treatment: No  History of treatment with gold:Does not know  Changes in medical history: No  Photos obtained: Yes  Does the patient smoke:No  Is the patient on ibuprofen/aspirin/plavix/coumadin/other blood thinner: No  If patient is taking narcotic or diazepam(valium)-does patient have : No  There were no vitals taken for this visit.

## 2018-09-26 NOTE — PATIENT INSTRUCTIONS
Pulse Dye Laser    I will experience redness, swelling, pain, and heat sensation. I may experience bruising, itching, or acne. Risks are blistering, oozing, permanent scarring, hair loss,  temporary or permanent skin lightening or darkening, infection, and eye injury. I understand my outcome could be no improvement, slight improvement. Multiple treatments may be required.    After treatment, Do Not:    Rub, scratch, or put weight on the site for 2 weeks    Wear tight fitting clothing or jewelry over the site    Alonzo. Keep the site out of sunlight. Use sunscreen of 30 SPF or greater when in the sun. Use sunscreen 30 minutes before going out and reapply if sweating. Tanning decreases the success of the treatment     How do I care for the treated site?    Use ice packs for 10 minutes after the procedure for swelling     If the site is on your face, use ice again 1 hour after treatment    If a scab or crust forms, gently cleanse the site with hydrogen peroxide. Then put on Vaseline  ointment 3 times a day    Do not use makeup on any open wound    What should I expect?    Blue-gray color that may take 2 to 3 weeks to go away    Redness may also last a week or longer    Results may take up to 3 or 4 months after treatment    More procedures may be needed    Who should I call with questions?    [facility]: [phone]     [facility]: [phone]    For urgent needs outside of business hours call the [facility] at [phone] and ask for the dermatology resident on call      CO2RE Laser    I will experience redness, swelling, peeling, pain, and heat sensation. I may experience bleeding, oozing, itching, or acne. Risks are blistering, permanent scarring, temporary or permanent skin lightening or darkening, infection, and eye injury. I understand my outcome could be no improvement, slight improvement. Multiple treatments may be required.    TWO WEEKS  BEFORE TREATMENT    Stop use of all Retinols   o Retin A, Tazorac,  anti-aging  products    Stop use of all glycolic acid treatments (longer if deeper peel)    Stop use of all salicylic acid products    Stop excessive sun exposure 8 weeks prior to treatment    Stop waxing    Stop abrasive scrubs    Stop microdermabrasion treatments    Stop hydroquinone(bleaching cream) 3 day sprior    Men should not shave 24 hours prior.      and bring  o Aquaphor   o Cetaphil cream  o Cetaphil gentle facial cleanser        AFTER CARE INSTRUCTIONS  -     - Avoid the sun    - You may start wound care after 24 hours or sooner if comfortable.    - The first 3 days, start soaks, twice daily, soak for a few minutes with a clean cloth saturated with a dilute vinegar solution to help prevent infection.    Mix 2 tablespoons of household white vinegar in 2 cups of water.    Prepare fresh each day     Apply  Aquaphor to protect and soften the peeling skin.   - After 3 days, cleanse the face water with water and a mild/gentle cleanser (i.e. Vanicream facial cleanser or Cetaphil facial cleanserproducts) once daily. Then, apply a thin layer of Cetaphil cream several times a day for healing and comfort.Once the skin sloughing and flaking is over, ointment (Aquaphor and epidermal repair cream) is no longer needed  - One sloughing and flaking is over sunblock  must be applied. Resume routine skin care and use of cosmetics as tolerated   - In some cases use of a bleaching cream may be recommended to start as well.  - Men may begin using an electric razor if they prefer to shave after 1 week.   - Apply sunblock every day and reapply every 2 hours when outside. Sun exposure can cause dark brown discoloration.    Reapply several times per day every day regardless of weather or indoors.    Use sunblock for face, SPF at least 30, broad spectrum (UVA & UVB)    Use diligently at least 3 months and avoid direct sun exposure.    Use a broad-rim hat if  outdoors for a long time.      Who should I call with questions?       [facility]: [phone]       [facility]: [phone]       For urgent needs outside of business hours call the [facility] at [phone]        and ask for the dermatology resident on call

## 2018-09-26 NOTE — NURSING NOTE
"Dermatology Rooming Note    Mayi Aleman's goals for this visit include:   Chief Complaint   Patient presents with     Derm Problem     Mayi is here for PDL and Co2 for a scar, she states \" I have not really noticed much difference.\"      Alem Welsh,AMARA   "

## 2019-01-09 ENCOUNTER — OFFICE VISIT (OUTPATIENT)
Dept: DERMATOLOGY | Facility: CLINIC | Age: 24
End: 2019-01-09

## 2019-01-09 DIAGNOSIS — L90.5 SCAR: Primary | ICD-10-CM

## 2019-01-09 DIAGNOSIS — L91.0 HYPERTROPHIC SCAR: ICD-10-CM

## 2019-01-09 ASSESSMENT — PAIN SCALES - GENERAL: PAINLEVEL: NO PAIN (0)

## 2019-01-09 NOTE — PATIENT INSTRUCTIONS
Pulse Dye Laser    I will experience redness, swelling, pain, and heat sensation. I may experience bruising, itching, or acne. Risks are blistering, oozing, permanent scarring, hair loss,  temporary or permanent skin lightening or darkening, infection, and eye injury. I understand my outcome could be no improvement, slight improvement. Multiple treatments may be required.    After treatment, Do Not:    Rub, scratch, or put weight on the site for 2 weeks    Wear tight fitting clothing or jewelry over the site    Alonzo. Keep the site out of sunlight. Use sunscreen of 30 SPF or greater when in the sun. Use sunscreen 30 minutes before going out and reapply if sweating. Tanning decreases the success of the treatment     How do I care for the treated site?    Use ice packs for 10 minutes after the procedure for swelling     If the site is on your face, use ice again 1 hour after treatment    If a scab or crust forms, gently cleanse the site with hydrogen peroxide. Then put on Vaseline  ointment 3 times a day    Do not use makeup on any open wound    What should I expect?    Blue-gray color that may take 2 to 3 weeks to go away    Redness may also last a week or longer    Results may take up to 3 or 4 months after treatment    More procedures may be needed    Who should I call with questions?    [facility]: [phone]     [facility]: [phone]    For urgent needs outside of business hours call the [facility] at [phone] and ask for the dermatology resident on call

## 2019-01-09 NOTE — PROGRESS NOTES
Drug Administration Record    Prior to injection, verified patient identity using patient's name and date of birth.  Due to injection administration, patient instructed to remain in clinic for 15 minutes  afterwards, and to report any adverse reaction to me immediately.    Drug Name: triamcinolone acetonide(kenalog)  Dose: 0.1mL of triamcinolone 5mg/mL, 5mg dose  Route administered: ID  NDC #: Kenalog-10 (3610-8886-17)  Amount of waste(mL):4ml  Reason for waste: Single use vial    LOT #: [id number]  SITE:   : [employer]  EXPIRATION DATE: 6/20

## 2019-01-09 NOTE — PROGRESS NOTES
Laser- VBeam(Pulsed Dye Laser) Procedure Note: Medical      Procedure Date: 2019    Attending Staff Surgeon: Carin Melendez MD    Resident Surgeon: Prosper Pathak MD    Assistant: Mohini Diane RN    Operating Room Data:             Surgery/Procedure Date:    SAME     Pre-operative Diagnosis:   Hypertrophic scar - now improved with PDL and CO2, no CO2 today, still active healing  Location: R upper back and R chest  Number of lesions: 2    Operation/Procedure    Vbeam pulsed dye laser treatment#: 3       Post-operative Diagnosis:  SAME    Laser Settings:  Energy:5 J/cm2  Spot size:7mm  Pulse width:  1.5 mS (0.45 thru 40 mS)  Dynamic cooling spray settin mS  Dynamic cooling device delay:  20 mS      Anesthesia:  BLT    Description of Operation/Procedure:   The nature and purpose of the procedure, associated risks, possible consequences, complications and alternative methods of treatment were explained in detail, this includes but is not limited to hyperpigmentation, hypopigmentation, scarring, bruising, hair loss pain/discomfort, eye injury, and blister. We reviewed that the outcome could be any of the following: no improvement, slight improvement or change in skin color & texture, the skin might be permanently lighter or darker, and though uncommon, superficial scarring may occur.  Multiple treatments may be recommended.   A photo and operative consent were obtained. Time-out was performed.The patient was positioned to optimally expose the area treated. Protective eyewear was worn by the patient and goggles on all personnel in the treatment room. The patient confirmed the site to be treated. The laser energy output was verified by meter reading.      The clinically evident lesion(s) was/were treated with Ceci Vbeam pulsed dye laser (595 nm) beam as above. A total of 21 pulses were used. The patient tolerated the procedure well and no complications were noted. Post operative instructions were  provided. The total laser operation and preparation time was 5 minutes.      The patient will follow-up in 2 months  .  After positioning and cleansing with isopropyl alcohol,0.1 total cc of Kenalog 5 mg/cc was injected into 1  Lesion(s) on the left posterior shoulder.  The patient tolerated the procedure well and left the Dermatology clinic in good condition.    Dr. Melendez staffed the patient was present for the entire procedure.            Staff Involved:  Resident/Staff       Staff Physician Comments:   I saw and evaluated the patient with the resident and I agree with the assessment and plan.  I was present for the examination and procedure.    Carin Melendez MD    Department of Dermatology  [facility]: Phone: [phone], Fax:[phone]  [facility]: Phone: [phone], Fax: [phone]

## 2019-01-09 NOTE — NURSING NOTE
Dermatology Rooming Note    Mayi Aleman's goals for this visit include:   Chief Complaint   Patient presents with     Laser Treatment     Mayi is here today for PDL/CO2 laser treatment of a scar on her chest and shoulder.      SHERLY Grajeda

## 2019-01-09 NOTE — LETTER
2019       RE: Mayi Aleman  [address] [facility] [address]     Dear Colleague,    Thank you for referring your patient, Mayi Aelman, to the [facility] DERMATOLOGY at [facility]. Please see a copy of my visit note below.        Laser- VBeam(Pulsed Dye Laser) Procedure Note: Medical      Procedure Date: 2019    Attending Staff Surgeon: Carin Melendez MD    Resident Surgeon: Prosper Pathak MD    Assistant: Mohini Diane RN    Operating Room Data:             Surgery/Procedure Date:    SAME     Pre-operative Diagnosis:   Hypertrophic scar - now improved with PDL and CO2, no CO2 today, still active healing  Location: R upper back and R chest  Number of lesions: 2    Operation/Procedure    Vbeam pulsed dye laser treatment#: 3       Post-operative Diagnosis:  SAME    Laser Settings:  Energy:5 J/cm2  Spot size:7mm  Pulse width:  1.5 mS (0.45 thru 40 mS)  Dynamic cooling spray settin mS  Dynamic cooling device delay:  20 mS      Anesthesia:  BLT    Description of Operation/Procedure:   The nature and purpose of the procedure, associated risks, possible consequences, complications and alternative methods of treatment were explained in detail, this includes but is not limited to hyperpigmentation, hypopigmentation, scarring, bruising, hair loss pain/discomfort, eye injury, and blister. We reviewed that the outcome could be any of the following: no improvement, slight improvement or change in skin color & texture, the skin might be permanently lighter or darker, and though uncommon, superficial scarring may occur.  Multiple treatments may be recommended.   A photo and operative consent were obtained. Time-out was performed.The patient was positioned to optimally expose the area treated. Protective eyewear was worn by the patient and goggles on all personnel in the treatment room. The patient confirmed the site to be treated. The laser  energy output was verified by meter reading.      The clinically evident lesion(s) was/were treated with Ceci Vbeam pulsed dye laser (595 nm) beam as above. A total of 21 pulses were used. The patient tolerated the procedure well and no complications were noted. Post operative instructions were provided. The total laser operation and preparation time was 5 minutes.      The patient will follow-up in 2 months  .  After positioning and cleansing with isopropyl alcohol,0.1 total cc of Kenalog 5 mg/cc was injected into 1  Lesion(s) on the left posterior shoulder.  The patient tolerated the procedure well and left the Dermatology clinic in good condition.    Dr. Melendez staffed the patient was present for the entire procedure.            Staff Involved:  Resident/Staff       Staff Physician Comments:   I saw and evaluated the patient with the resident and I agree with the assessment and plan.  I was present for the examination and procedure.    Carin Melendez MD    Department of Dermatology  [facility]: Phone: [phone], Fax:[phone]  [facility]: Phone: [phone], Fax: [phone]            Dermatology Laser Intake Checklist:  History of psoriasis:No  History of recent tan, indoor or outdoor tanning/vacation or other sun exposure: No  History of vitiligo:No  Family history of vitiligo:No  Recent other cosmetic procedure(microderm abrasion/peel/hair removal/facial etc):No  History of HSV:No   Did the patient start valtrex: No  For genital laser hair removal patient only: Is there a history of genital warts or condyloma:No  Tattoo in the area to be treated:No  Is patient using hydroquinone:No  Retinoids and other acne medications stopped for 2 weeks:No  Has the patient had accutane in the last 6-12 months:No  Pregnant or breastfeeding: No  History of skin cancer in area planned for  treatment: No  History of treatment with gold:No  Changes in medical history: No  Photos obtained: Yes  Does the patient smoke:No  Is the patient on ibuprofen/aspirin/plavix/coumadin/other blood thinner: No  If patient is taking narcotic or diazepam(valium)-does patient have : No  There were no vitals taken for this visit.                    Drug Administration Record    Prior to injection, verified patient identity using patient's name and date of birth.  Due to injection administration, patient instructed to remain in clinic for 15 minutes  afterwards, and to report any adverse reaction to me immediately.    Drug Name: triamcinolone acetonide(kenalog)  Dose: 0.1mL of triamcinolone 5mg/mL, 5mg dose  Route administered: ID  NDC #: Kenalog-10 (2073-4675-21)  Amount of waste(mL):4ml  Reason for waste: Single use vial    LOT #: [id number]  SITE:   : [email or web address]  EXPIRATION DATE: 6/20  Again, thank you for allowing me to participate in the care of your patient.      Sincerely,    Carin Melendez MD

## 2019-01-09 NOTE — LETTER
2019      RE: Mayi Aleman  [address] [facility] [address]       Laser- VBeam(Pulsed Dye Laser) Procedure Note: Medical      Procedure Date: 2019    Attending Staff Surgeon: Carin Melendez MD    Resident Surgeon: Prosper Pathak MD    Assistant: Mohini Diane RN    Operating Room Data:             Surgery/Procedure Date:    SAME     Pre-operative Diagnosis:   Hypertrophic scar - now improved with PDL and CO2, no CO2 today, still active healing  Location: R upper back and R chest  Number of lesions: 2    Operation/Procedure    Vbeam pulsed dye laser treatment#: 3       Post-operative Diagnosis:  SAME    Laser Settings:  Energy:5 J/cm2  Spot size:7mm  Pulse width:  1.5 mS (0.45 thru 40 mS)  Dynamic cooling spray settin mS  Dynamic cooling device delay:  20 mS      Anesthesia:  BLT    Description of Operation/Procedure:   The nature and purpose of the procedure, associated risks, possible consequences, complications and alternative methods of treatment were explained in detail, this includes but is not limited to hyperpigmentation, hypopigmentation, scarring, bruising, hair loss pain/discomfort, eye injury, and blister. We reviewed that the outcome could be any of the following: no improvement, slight improvement or change in skin color & texture, the skin might be permanently lighter or darker, and though uncommon, superficial scarring may occur.  Multiple treatments may be recommended.   A photo and operative consent were obtained. Time-out was performed.The patient was positioned to optimally expose the area treated. Protective eyewear was worn by the patient and goggles on all personnel in the treatment room. The patient confirmed the site to be treated. The laser energy output was verified by meter reading.      The clinically evident lesion(s) was/were treated with Ceci Vbeam pulsed dye laser (595 nm) beam as above. A total of 21 pulses were used. The patient  tolerated the procedure well and no complications were noted. Post operative instructions were provided. The total laser operation and preparation time was 5 minutes.      The patient will follow-up in 2 months  .  After positioning and cleansing with isopropyl alcohol,0.1 total cc of Kenalog 5 mg/cc was injected into 1  Lesion(s) on the left posterior shoulder.  The patient tolerated the procedure well and left the Dermatology clinic in good condition.    Dr. Melendez staffed the patient was present for the entire procedure.            Staff Involved:  Resident/Staff       Staff Physician Comments:   I saw and evaluated the patient with the resident and I agree with the assessment and plan.  I was present for the examination and procedure.    Carin Melendez MD    Department of Dermatology  [facility]: Phone: [phone], Fax:[phone]  [facility]: Phone: [phone], Fax: [phone]            Dermatology Laser Intake Checklist:  History of psoriasis:No  History of recent tan, indoor or outdoor tanning/vacation or other sun exposure: No  History of vitiligo:No  Family history of vitiligo:No  Recent other cosmetic procedure(microderm abrasion/peel/hair removal/facial etc):No  History of HSV:No   Did the patient start valtrex: No  For genital laser hair removal patient only: Is there a history of genital warts or condyloma:No  Tattoo in the area to be treated:No  Is patient using hydroquinone:No  Retinoids and other acne medications stopped for 2 weeks:No  Has the patient had accutane in the last 6-12 months:No  Pregnant or breastfeeding: No  History of skin cancer in area planned for treatment: No  History of treatment with gold:No  Changes in medical history: No  Photos obtained: Yes  Does the patient smoke:No  Is the patient on ibuprofen/aspirin/plavix/coumadin/other blood thinner:  No  If patient is taking narcotic or diazepam(valium)-does patient have : No  There were no vitals taken for this visit.                    Drug Administration Record    Prior to injection, verified patient identity using patient's name and date of birth.  Due to injection administration, patient instructed to remain in clinic for 15 minutes  afterwards, and to report any adverse reaction to me immediately.    Drug Name: triamcinolone acetonide(kenalog)  Dose: 0.1mL of triamcinolone 5mg/mL, 5mg dose  Route administered: ID  NDC #: Kenalog-10 (9020-7566-28)  Amount of waste(mL):4ml  Reason for waste: Single use vial    LOT #: [id number]  SITE:   : [employer]  EXPIRATION DATE: 6/20      Carin Melendez MD

## 2019-02-14 ENCOUNTER — DOCUMENTATION ONLY (OUTPATIENT)
Dept: CARE COORDINATION | Facility: CLINIC | Age: 24
End: 2019-02-14

## 2019-03-06 ENCOUNTER — OFFICE VISIT (OUTPATIENT)
Dept: DERMATOLOGY | Facility: CLINIC | Age: 24
End: 2019-03-06

## 2019-03-06 DIAGNOSIS — L90.5 SCAR: Primary | ICD-10-CM

## 2019-03-06 ASSESSMENT — PAIN SCALES - GENERAL
PAINLEVEL: NO PAIN (1)
PAINLEVEL: NO PAIN (0)

## 2019-03-06 NOTE — LETTER
3/6/2019       RE: Mayi Aleman  [address] [facility] [address]     Dear Colleague,    Thank you for referring your patient, Mayi Aleman, to the [facility] DERMATOLOGY at [facility]. Please see a copy of my visit note below.    Laser- VBeam(Pulsed Dye Laser) Procedure Note: Medical      Procedure Date: Mar 6, 2019    Attending Staff Surgeon: Carin Melendez MD    Resident Surgeon: Eve Kaufman MD    Assistant: Soni Mac RN    Operating Room Data:         Surgery/Procedure Date:    SAME     Pre-operative Diagnosis:   Hypertrophic scar - now improved with PDL and CO2, no CO2 today, still active healing  Location: R upper back and R chest  Number of lesions: 2    Operation/Procedure    Vbeam pulsed dye laser treatment#: 4      Post-operative Diagnosis:  SAME    Laser Settings:  Energy:5 J/cm2  Spot size:7mm  Pulse width:  1.5 mS (0.45 thru 40 mS)  Dynamic cooling spray settin mS  Dynamic cooling device delay:  20 mS      Anesthesia:  BLT    Description of Operation/Procedure:   The nature and purpose of the procedure, associated risks, possible consequences, complications and alternative methods of treatment were explained in detail, this includes but is not limited to hyperpigmentation, hypopigmentation, scarring, bruising, hair loss pain/discomfort, eye injury, and blister. We reviewed that the outcome could be any of the following: no improvement, slight improvement or change in skin color & texture, the skin might be permanently lighter or darker, and though uncommon, superficial scarring may occur.  Multiple treatments may be recommended.     A photo and operative consent were obtained. Time-out was performed.The patient was positioned to optimally expose the area treated. Protective eyewear was worn by the patient and goggles on all personnel in the treatment room. The patient confirmed the site to be treated. The laser energy output  was verified by meter reading.      The clinically evident lesion(s) was/were treated with Ceci Vbeam pulsed dye laser (595 nm) beam as above. A total of 17 pulses were used. The patient tolerated the procedure well and no complications were noted. Post operative instructions were provided. The total laser operation and preparation time was 5 minutes.      The patient will follow-up in 2 months    Dr. Melendez staffed the patient was present for the entire procedure.    CORE Procedure: Medical     Procedure Date: 3/6/19  Staff: Carin Melendez MD  Resident/Fellow:   Eve Kaufman MD     Procedure:   CO2RE, fractional CO2 ablative laser treatment #:  3rd treatment, single pass, 2 areas  Approximate treatment area:  2.6x1.2 (chest), 2.6x1.4 (back)  Approximate length of treatment: 10 minutes     Anesthesia and Premedication:  Anesthesia (topical): Benzocaine 20%/Lidocaine 8%/Tetracaine 4% ointment        Device Settings:  Diagnosis:   Location: right upper back   Mode(class/deep/mid/etc): deep  Frational coverage(%): 3% (back)  Core(mJ):  70 (back)        Description of Procedure:   The nature and purpose of the procedure, associated risks, possible consequences, complications, and alternative methods of treatment were explained in detail including but not limited to redness, swelling, peeling of the skin, eye injury, infection, bleeding, oozing, heat sensation, itching or acne.  Possible outcomes were reviewed including the following: no improvement, slight improvement, skin lightening or darkening. The possibility of permanent scarring was reviewed. Discussion that multiple treatments may be required was completed.      Photo consent was obtained and reviewed, a time out was performed, and informed consent was obtained.  Protective eyewear was worn by the patient and all personnel in the treatment room  The patient confirmed the site to be treated. The laser energy output was verified by meter reading. The [employer]  cooler and smoke evacuator devices were operated coincident with the CO2RE laser. The areas were treated with CO2RE laser as described. Kenalog 10 (0.1) was massaged into scar.  The patient tolerated the procedure well and no complications were noted. Verbal and written aftercare instructions were provided. Post procedure care including use of mild, gentle cleansers and moisturizers  for the first week following treatment was reviewed. The patient was recommend application of at least SPF 30 sunscreen daily  and avoidance of direct sunlight up to 3 months post procedure.  The patient was discharged from the dermatology clinic in good condition.     The patient will follow up in 3 months.       Staff Involved:  Resident(Eve Kaufman)/Staff(as above)    Eve Kaufman M.D.  PGY-3 Resident  Dermatology  [facility]  Pager [phone]          Staff Physician Comments:   I saw and evaluated the patient with the resident and I agree with the assessment and plan.  I was present for the examination and the procedure. .    Carin Melendez MD    Department of Dermatology  [facility]: Phone: [phone], Fax:[phone]  [facility]: Phone: [phone], Fax: [phone]          Again, thank you for allowing me to participate in the care of your patient.      Sincerely,    Carin Melendez MD

## 2019-03-06 NOTE — NURSING NOTE
BLT cream applied @ 1225.  LOT-  [id number]  Mfgr on 1/02/2019  NDC-19233-980-18  EXP-7/01/2019

## 2019-03-06 NOTE — NURSING NOTE
Chief Complaint   Patient presents with     Scar Management     Mayi is here today to have scars treated-she notes improvement after her last treatment.      Alyssa Moreno LPN

## 2019-03-06 NOTE — PROGRESS NOTES
Laser- VBeam(Pulsed Dye Laser) Procedure Note: Medical      Procedure Date: Mar 6, 2019    Attending Staff Surgeon: Carin Melendez MD    Resident Surgeon: Eve Kaufman MD    Assistant: Soni Mac RN    Operating Room Data:         Surgery/Procedure Date:    SAME     Pre-operative Diagnosis:   Hypertrophic scar - now improved   Location: R upper back and R chest  Number of lesions: 2    Operation/Procedure    Vbeam pulsed dye laser treatment#: 4      Post-operative Diagnosis:  SAME    Laser Settings:  Energy:5 J/cm2  Spot size:7mm  Pulse width:  1.5 mS (0.45 thru 40 mS)  Dynamic cooling spray settin mS  Dynamic cooling device delay:  20 mS      Anesthesia:  BLT    Description of Operation/Procedure:   The nature and purpose of the procedure, associated risks, possible consequences, complications and alternative methods of treatment were explained in detail, this includes but is not limited to hyperpigmentation, hypopigmentation, scarring, bruising, hair loss pain/discomfort, eye injury, and blister. We reviewed that the outcome could be any of the following: no improvement, slight improvement or change in skin color & texture, the skin might be permanently lighter or darker, and though uncommon, superficial scarring may occur.  Multiple treatments may be recommended.     A photo and operative consent were obtained. Time-out was performed.The patient was positioned to optimally expose the area treated. Protective eyewear was worn by the patient and goggles on all personnel in the treatment room. The patient confirmed the site to be treated. The laser energy output was verified by meter reading.      The clinically evident lesion(s) was/were treated with Ceci Vbeam pulsed dye laser (595 nm) beam as above. A total of 17 pulses were used. The patient tolerated the procedure well and no complications were noted. Post operative instructions were provided. The total laser operation and preparation time was 5  minutes.      The patient will follow-up in 2 months    Dr. Melendez staffed the patient was present for the entire procedure.    CORE Procedure: Medical     Procedure Date: 3/6/19  Staff: Carin Melendez MD  Resident/Fellow:  Eve Kaufman MD     Procedure:   CO2RE, fractional CO2 ablative laser treatment #: 3rd treatment, single pass, 2 areas  Approximate treatment area:  2.6x1.2 (chest), 2.6x1.4 (back)  Approximate length of treatment: 10 minutes     Anesthesia and Premedication:  Anesthesia (topical): Benzocaine 20%/Lidocaine 8%/Tetracaine 4% ointment        Device Settings:  Diagnosis:   Location: right upper back   Mode(class/deep/mid/etc): deep  Frational coverage(%): 3% (back)  Core(mJ):  70 (back)        Description of Procedure:   The nature and purpose of the procedure, associated risks, possible consequences, complications, and alternative methods of treatment were explained in detail including but not limited to redness, swelling, peeling of the skin, eye injury, infection, bleeding, oozing, heat sensation, itching or acne.  Possible outcomes were reviewed including the following: no improvement, slight improvement, skin lightening or darkening. The possibility of permanent scarring was reviewed. Discussion that multiple treatments may be required was completed.      Photo consent was obtained and reviewed, a time out was performed, and informed consent was obtained.  Protective eyewear was worn by the patient and all personnel in the treatment room  The patient confirmed the site to be treated. The laser energy output was verified by meter reading. The [employer] cooler and smoke evacuator devices were operated coincident with the CO2RE laser. The areas were treated with CO2RE laser as described. Kenalog 10 (0.1) was massaged into scar.  The patient tolerated the procedure well and no complications were noted. Verbal and written aftercare instructions were provided. Post procedure care including use of mild,  gentle cleansers and moisturizers  for the first week following treatment was reviewed. The patient was recommend application of at least SPF 30 sunscreen daily  and avoidance of direct sunlight up to 3 months post procedure.  The patient was discharged from the dermatology clinic in good condition.     The patient will follow up in 3 months.       Staff Involved:  Resident(Eve Kaufman)/Staff(as above)    Eve Kaufman M.D.  PGY-3 Resident  Dermatology  [facility]  Pager [phone]          Staff Physician Comments:   I saw and evaluated the patient with the resident and I agree with the assessment and plan.  I was present for the examination and the procedure. .    Carin Melendez MD    Department of Dermatology  [facility]: Phone: [phone], Fax:[phone]  [facility]: Phone: [phone], Fax: [phone]

## 2019-03-06 NOTE — PATIENT INSTRUCTIONS
Pulse Dye Laser    I will experience redness, swelling, pain, and heat sensation. I may experience bruising, itching, or acne. Risks are blistering, oozing, permanent scarring, hair loss,  temporary or permanent skin lightening or darkening, infection, and eye injury. I understand my outcome could be no improvement, slight improvement. Multiple treatments may be required.    After treatment, Do Not:    Rub, scratch, or put weight on the site for 2 weeks    Wear tight fitting clothing or jewelry over the site    Alonzo. Keep the site out of sunlight. Use sunscreen of 30 SPF or greater when in the sun. Use sunscreen 30 minutes before going out and reapply if sweating. Tanning decreases the success of the treatment     How do I care for the treated site?    Use ice packs for 10 minutes after the procedure for swelling     If the site is on your face, use ice again 1 hour after treatment    If a scab or crust forms, gently cleanse the site with hydrogen peroxide. Then put on Vaseline  ointment 3 times a day    Do not use makeup on any open wound    What should I expect?    Blue-gray color that may take 2 to 3 weeks to go away    Redness may also last a week or longer    Results may take up to 3 or 4 months after treatment    More procedures may be needed    Who should I call with questions?    [facility]: [phone]     [facility]: [phone]    For urgent needs outside of business hours call the [facility] at [phone] and ask for the dermatology resident on call        CO2RE Laser    I will experience redness, swelling, peeling, pain, and heat sensation. I may experience bleeding, oozing, itching, or acne. Risks are blistering, permanent scarring, temporary or permanent skin lightening or darkening, infection, and eye injury. I understand my outcome could be no improvement, slight improvement. Multiple treatments may be required.    TWO WEEKS  BEFORE TREATMENT    Stop use of all Retinols   o Retin A, Tazorac,  anti-aging  products    Stop use of all glycolic acid treatments (longer if deeper peel)    Stop use of all salicylic acid products    Stop excessive sun exposure 8 weeks prior to treatment    Stop waxing    Stop abrasive scrubs    Stop microdermabrasion treatments    Stop hydroquinone(bleaching cream) 3 day sprior    Men should not shave 24 hours prior.      and bring  o Aquaphor   o Cetaphil cream  o Cetaphil gentle facial cleanser    THE DAY OF TREATMENT    Come to the appointment with no make-up, lotions or other products on the face    Valtrex 500mg twice daily, start 2 days prior and continue taking for 1-2 weeks as per your dcotor    Bring your diazepam and percocet pills to your laser procedure    Start Keflex three times daily start after laser procedure    Take to your doctor about pain control after the procedure    If taking pain medication, please, bring a  or we will be unable to do the procedure      AFTER CARE INSTRUCTIONS  - Continue your Valtrex    - Start your Cephalexin(Keflex) right after your laser procedure    - Avoid the sun    - You may start wound care after 24 hours or sooner if comfortable.    - The first 3 days, start soaks, twice daily, soak for a few minutes with a clean cloth saturated with a dilute vinegar solution to help prevent infection.    Mix 2 tablespoons of household white vinegar in 2 cups of water.    Prepare fresh each day     Apply  Aquaphor to protect and soften the peeling skin.   - After 3 days, cleanse the face water with water and a mild/gentle cleanser (i.e. Vanicream facial cleanser or Cetaphil facial cleanserproducts) once daily. Then, apply a thin layer of Cetaphil cream several times a day for healing and comfort.Once the skin sloughing and flaking is over, ointment (Aquaphor and epidermal repair cream) is no longer needed  - One sloughing and flaking is over sunblock  must be  applied. Resume routine skin care and use of cosmetics as tolerated   - In some cases use of a bleaching cream may be recommended to start as well.  - Men may begin using an electric razor if they prefer to shave after 1 week.   - Apply sunblock every day and reapply every 2 hours when outside. Sun exposure can cause dark brown discoloration.    Reapply several times per day every day regardless of weather or indoors.    Use sunblock for face, SPF at least 30, broad spectrum (UVA & UVB)    Use diligently at least 3 months and avoid direct sun exposure.    Use a broad-rim hat if outdoors for a long time.      Who should I call with questions?       [facility]: [phone]       [facility]: [phone]       For urgent needs outside of business hours call the [facility] at [phone]        and ask for the dermatology resident on call              Pulse Dye Laser    I will experience redness, swelling, pain, and heat sensation. I may experience bruising, itching, or acne. Risks are blistering, oozing, permanent scarring, hair loss,  temporary or permanent skin lightening or darkening, infection, and eye injury. I understand my outcome could be no improvement, slight improvement. Multiple treatments may be required.    After treatment, Do Not:    Rub, scratch, or put weight on the site for 2 weeks    Wear tight fitting clothing or jewelry over the site    Alonzo. Keep the site out of sunlight. Use sunscreen of 30 SPF or greater when in the sun. Use sunscreen 30 minutes before going out and reapply if sweating. Tanning decreases the success of the treatment     How do I care for the treated site?    Use ice packs for 10 minutes after the procedure for swelling     If the site is on your face, use ice again 1 hour after treatment    If a scab or crust forms, gently cleanse the site with hydrogen peroxide. Then put on Vaseline  ointment 3 times a  day    Do not use makeup on any open wound    What should I expect?    Blue-gray color that may take 2 to 3 weeks to go away    Redness may also last a week or longer    Results may take up to 3 or 4 months after treatment    More procedures may be needed    Who should I call with questions?    [facility]: [phone]     [facility]: [phone]    For urgent needs outside of business hours call the [facility] at [phone] and ask for the dermatology resident on call    CO2RE Laser    I will experience redness, swelling, peeling, pain, and heat sensation. I may experience bleeding, oozing, itching, or acne. Risks are blistering, permanent scarring, temporary or permanent skin lightening or darkening, infection, and eye injury. I understand my outcome could be no improvement, slight improvement. Multiple treatments may be required.           - AFTER CARE    - Avoid the sun    - You may start wound care after 24 hours or sooner if comfortable.    - The first 3 days, start soaks, twice daily, soak for a few minutes with a clean cloth saturated with a dilute vinegar solution to help prevent infection.    Mix 2 tablespoons of household white vinegar in 2 cups of water.    Prepare fresh each day     Apply  Aquaphor to protect and soften the peeling skin.   - After 3 days, cleanse the face water with water and a mild/gentle cleanser (i.e. Vanicream facial cleanser or Cetaphil facial cleanserproducts) once daily. Then, apply a thin layer of Cetaphil cream several times a day for healing and comfort.Once the skin sloughing and flaking is over, ointment (Aquaphor and epidermal repair cream) is no longer needed  - One sloughing and flaking is over sunblock  must be applied. Resume routine skin care and use of cosmetics as tolerated   - In some cases use of a bleaching cream may be recommended to start as well.  - Men may begin using an electric razor  if they prefer to shave after 1 week.   - Apply sunblock every day and reapply every 2 hours when outside. Sun exposure can cause dark brown discoloration.    Reapply several times per day every day regardless of weather or indoors.    Use sunblock for face, SPF at least 30, broad spectrum (UVA & UVB)    Use diligently at least 3 months and avoid direct sun exposure.    Use a broad-rim hat if outdoors for a long time.      Who should I call with questions?       [facility]: [phone]       [facility]: [phone]       For urgent needs outside of business hours call the [facility] at [phone]        and ask for the dermatology resident on call

## 2019-06-26 ENCOUNTER — OFFICE VISIT (OUTPATIENT)
Dept: DERMATOLOGY | Facility: CLINIC | Age: 24
End: 2019-06-26

## 2019-06-26 DIAGNOSIS — L90.5 SCAR: Primary | ICD-10-CM

## 2019-06-26 DIAGNOSIS — L91.0 HYPERTROPHIC SCAR: ICD-10-CM

## 2019-06-26 NOTE — PROGRESS NOTES
Dermatology Rooming Note    Mayi Aleman's goals for this visit include:   Chief Complaint   Patient presents with     Derm Problem     Here today for laser procedure on R chest and R shoulder       Dermatology Laser Intake Checklist:  History of psoriasis:No  History of recent tan, indoor or outdoor tanning/vacation or other sun exposure: No  History of vitiligo:No  Family history of vitiligo:No  Recent other cosmetic procedure(microderm abrasion/peel/hair removal/facial etc):No  History of HSV:No   Did the patient start valtrex: No  For genital laser hair removal patient only: Is there a history of genital warts or condyloma:No  Tattoo in the area to be treated:No  Is patient using hydroquinone:No  Retinoids and other acne medications stopped for 2 weeks:No  Has the patient had accutane in the last 6-12 months:No  Pregnant or breastfeeding: No  History of skin cancer in area planned for treatment: No  History of treatment with gold:No  Changes in medical history: No  Photos obtained: No  Does the patient smoke:No  Is the patient on ibuprofen/aspirin/plavix/coumadin/other blood thinner: No  If patient is taking narcotic or diazepam(valium)-does patient have : No  There were no vitals taken for this visit.

## 2019-06-26 NOTE — PATIENT INSTRUCTIONS
Keratosis pilaris- forea hai  Amlactin OTC cream/lotion to back of arms for roughness  Hydrocortisone 1% OTC cream for redness for up to 2 weeks    CO2RE Laser    I will experience redness, swelling, peeling, pain, and heat sensation. I may experience bleeding, oozing, itching, or acne. Risks are blistering, permanent scarring, temporary or permanent skin lightening or darkening, infection, and eye injury. I understand my outcome could be no improvement, slight improvement. Multiple treatments may be required.      THE DAY OF TREATMENT    Bring your diazepam and percocet pills to your laser procedure    Take to your doctor about pain control after the procedure    If taking pain medication, please, bring a  or we will be unable to do the procedure      AFTER CARE INSTRUCTIONS  - Avoid the sun    - You may start wound care after 24 hours or sooner if comfortable.    - The first 3 days, start soaks, twice daily, soak for a few minutes with a clean cloth saturated with a dilute vinegar solution to help prevent infection.    Mix 2 tablespoons of household white vinegar in 2 cups of water.    Prepare fresh each day     Apply  Aquaphor to protect and soften the peeling skin.   - After 3 days, cleanse the face water with water and a mild/gentle cleanser (i.e. Vanicream facial cleanser or Cetaphil facial cleanserproducts) once daily. Then, apply a thin layer of Cetaphil cream several times a day for healing and comfort.Once the skin sloughing and flaking is over, ointment (Aquaphor and epidermal repair cream) is no longer needed  - One sloughing and flaking is over sunblock  must be applied. Resume routine skin care and use of cosmetics as tolerated   - In some cases use of a bleaching cream may be recommended to start as well.  - Men may begin using an electric razor if they prefer to shave after 1 week.   - Apply sunblock every day and reapply every 2 hours when outside. Sun exposure can cause dark brown  discoloration.    Reapply several times per day every day regardless of weather or indoors.    Use sunblock for face, SPF at least 30, broad spectrum (UVA & UVB)    Use diligently at least 3 months and avoid direct sun exposure.    Use a broad-rim hat if outdoors for a long time.      Who should I call with questions?       [facility]: [phone]       [facility]: [phone]       For urgent needs outside of business hours call the [facility] at [phone]        and ask for the dermatology resident on call              Pulse Dye Laser    I will experience redness, swelling, pain, and heat sensation. I may experience bruising, itching, or acne. Risks are blistering, oozing, permanent scarring, hair loss,  temporary or permanent skin lightening or darkening, infection, and eye injury. I understand my outcome could be no improvement, slight improvement. Multiple treatments may be required.    After treatment, Do Not:    Rub, scratch, or put weight on the site for 2 weeks    Wear tight fitting clothing or jewelry over the site    Alonzo. Keep the site out of sunlight. Use sunscreen of 30 SPF or greater when in the sun. Use sunscreen 30 minutes before going out and reapply if sweating. Tanning decreases the success of the treatment     How do I care for the treated site?    Use ice packs for 10 minutes after the procedure for swelling     If the site is on your face, use ice again 1 hour after treatment    If a scab or crust forms, gently cleanse the site with hydrogen peroxide. Then put on Vaseline  ointment 3 times a day    Do not use makeup on any open wound    What should I expect?    Blue-gray color that may take 2 to 3 weeks to go away    Redness may also last a week or longer    Results may take up to 3 or 4 months after treatment    More procedures may be needed    Who should I call with questions?    [facility]  [place]: [phone]     [facility]: [phone]    For urgent needs outside of business hours call the [facility] at [phone] and ask for the dermatology resident on call

## 2019-06-26 NOTE — LETTER
6/26/2019       RE: Mayi Aleman  [address] [address] [address]     Dear Colleague,    Thank you for referring your patient, Mayi Aleman, to the [facility] DERMATOLOGY at [facility]. Please see a copy of my visit note below.    Dermatology Rooming Note    Mayi Aleman's goals for this visit include:   Chief Complaint   Patient presents with     Derm Problem     Here today for laser procedure on R chest and R shoulder       Dermatology Laser Intake Checklist:  History of psoriasis:No  History of recent tan, indoor or outdoor tanning/vacation or other sun exposure: No  History of vitiligo:No  Family history of vitiligo:No  Recent other cosmetic procedure(microderm abrasion/peel/hair removal/facial etc):No  History of HSV:No   Did the patient start valtrex: No  For genital laser hair removal patient only: Is there a history of genital warts or condyloma:No  Tattoo in the area to be treated:No  Is patient using hydroquinone:No  Retinoids and other acne medications stopped for 2 weeks:No  Has the patient had accutane in the last 6-12 months:No  Pregnant or breastfeeding: No  History of skin cancer in area planned for treatment: No  History of treatment with gold:No  Changes in medical history: No  Photos obtained: No  Does the patient smoke:No  Is the patient on ibuprofen/aspirin/plavix/coumadin/other blood thinner: No  If patient is taking narcotic or diazepam(valium)-does patient have : No  There were no vitals taken for this visit.            [facility] Dermatology Note      Dermatology Problem List:  1. Consistent with pigmented spindle cell nevus, right upper back  -s/p excision 8/11/2015  2. History of dysplastic nevi  -compound nevus with moderate dysplastic on th left buttock 6/2017  -Compound dysplastic nevus with moderate to severe atypia, right posterior shoulder, s/p excision 10/6/2016  -Compound  dysplastic nevus with moderate to severe atypia, right chest, s/p excision 8/11/2015  3. Hypertrophic scar , right upper back, right chest  -s/p PDL and Core laser treatment     Encounter Date: Jun 26, 2019    CC:  Chief Complaint   Patient presents with     Derm Problem     Here today for laser procedure on R chest and R shoulder         History of Present Illness:  Ms. Mayi Aleman is a 24 year old female who presents for laser treatment of hypertrophic scars on the right upper back and right chest. She was last seen on 3/6/19 for treatment. Today she also reports concerns with her upper arms. They are bumpy and red, would like to discuss what she can do about the bumps.     Past Medical History:   Patient Active Problem List   Diagnosis     Contraception     Exercise-induced asthma     Intermittent asthma     Family history of malignant neoplasm of breast     Past Medical History:   Diagnosis Date     Exercise-induced asthma 3/4/2011     Past Surgical History:   Procedure Laterality Date     APPENDECTOMY  12/08/06    Laparoscopic      BIOPSY OF SKIN LESION       COLONOSCOPY  1/28/2013    Procedure: COLONOSCOPY;  Colonoscopy;  Surgeon: Jamison Christianson MD;  Location: [facility]       Social History:  Patient reports that she has never smoked. She has never used smokeless tobacco. She reports that she does not drink alcohol or use drugs.    Family History:  Family History   Problem Relation Age of Onset     Asthma Mother      Hypertension Maternal Grandmother      Lipids Maternal Grandmother      Asthma Maternal Grandmother      Skin Cancer Maternal Grandmother      Cancer Paternal Grandfather         skin     Melanoma No family hx of        Medications:  Current Outpatient Medications   Medication Sig Dispense Refill     albuterol 90 MCG/ACT inhaler Inhale 2 puffs into the lungs every 6 hours as needed for shortness of breath / dyspnea. (Patient not taking: Reported on 8/17/2018) 1 Inhaler 12      levonorgestrel-ethinyl estradiol (SEASONALE) 0.15-0.03 MG per tablet Take 1 tablet by mouth daily 91 tablet 4     ondansetron (ZOFRAN ODT) 4 MG ODT tab Take 4-8 mg by mouth every 8 hours as needed for nausea Reported on 3/21/2017 20 tablet 1     Spacer/Aero-Holding Chambers GRISELDA 1 Device daily. (Patient not taking: Reported on 8/8/2018) 1 Device prn       Allergies   Allergen Reactions     Sulfate        Review of Systems:    -Constitutional: The patient is feeling generally well.    Physical exam:  Vitals: There were no vitals taken for this visit.  GEN: This is a well developed, well-nourished female in no acute distress, in a pleasant mood.    SKIN: Focused examination of the arms was performed.  - Spiny keratotic papules and erythema on the upper arms  -scar on the chest and back  - No other lesions of concern on areas examined.       Impression/Plan:  1. Keratosis Pilaris - upper arms     Recommend hydrocortisone or AmLactin     2. Hypertrophic scar    See PDL and CORE procedure note  Kenalog intralesional in procedure note: After verbal consent , after CO2 total cc of Kenalog 2.5 mg/cc wasapplid topically after Co2 laser on the hypertrophic scar portion of the back.  The patient tolerated the procedure well and left the Dermatology clinic in good condition.          Follow-up in Pending sale to [facility] for new or changing lesions.       Staff Involved:  Scribe/Staff    Scribe Disclosure  I, Scarlett White, am serving as a scribe to document services personally performed by Dr. Carin Melendez MD, based on data collection and the provider's statements to me.     Provider Disclosure:   The documentation recorded by the scribe accurately reflects the services I personally performed and the decisions made by me.    Carin Melendez MD    Department of Dermatology  [facility]: Phone: [phone], Fax:[phone]  [facility]  [facility]: Phone: [phone], Fax: [phone]            Again, thank you for allowing me to participate in the care of your patient.      Sincerely,    Carin Melendez MD

## 2019-06-26 NOTE — PROGRESS NOTES
[facility] Dermatology Note      Dermatology Problem List:  1. Consistent with pigmented spindle cell nevus, right upper back  -s/p excision 8/11/2015  2. History of dysplastic nevi  -compound nevus with moderate dysplastic on th left buttock 6/2017  -Compound dysplastic nevus with moderate to severe atypia, right posterior shoulder, s/p excision 10/6/2016  -Compound dysplastic nevus with moderate to severe atypia, right chest, s/p excision 8/11/2015  3. Hypertrophic scar , right upper back, right chest  -s/p PDL and Core laser treatment     Encounter Date: Jun 26, 2019    CC:  Chief Complaint   Patient presents with     Derm Problem     Here today for laser procedure on R chest and R shoulder         History of Present Illness:  Ms. Mayi Aleman is a 24 year old female who presents for laser treatment of hypertrophic scars on the right upper back and right chest. She was last seen on 3/6/19 for treatment. Today she also reports concerns with her upper arms. They are bumpy and red, would like to discuss what she can do about the bumps.     Past Medical History:   Patient Active Problem List   Diagnosis     Contraception     Exercise-induced asthma     Intermittent asthma     Family history of malignant neoplasm of breast     Past Medical History:   Diagnosis Date     Exercise-induced asthma 3/4/2011     Past Surgical History:   Procedure Laterality Date     APPENDECTOMY  12/08/06    Laparoscopic      BIOPSY OF SKIN LESION       COLONOSCOPY  1/28/2013    Procedure: COLONOSCOPY;  Colonoscopy;  Surgeon: Jamison Christianson MD;  Location:  GI       Social History:  Patient reports that she has never smoked. She has never used smokeless tobacco. She reports that she does not drink alcohol or use drugs.    Family History:  Family History   Problem Relation Age of Onset     Asthma Mother      Hypertension Maternal Grandmother      Lipids Maternal Grandmother      Asthma Maternal Grandmother       Skin Cancer Maternal Grandmother      Cancer Paternal Grandfather         skin     Melanoma No family hx of        Medications:  Current Outpatient Medications   Medication Sig Dispense Refill     albuterol 90 MCG/ACT inhaler Inhale 2 puffs into the lungs every 6 hours as needed for shortness of breath / dyspnea. (Patient not taking: Reported on 8/17/2018) 1 Inhaler 12     levonorgestrel-ethinyl estradiol (SEASONALE) 0.15-0.03 MG per tablet Take 1 tablet by mouth daily 91 tablet 4     ondansetron (ZOFRAN ODT) 4 MG ODT tab Take 4-8 mg by mouth every 8 hours as needed for nausea Reported on 3/21/2017 20 tablet 1     Spacer/Aero-Holding Chambers GRISELDA 1 Device daily. (Patient not taking: Reported on 8/8/2018) 1 Device prn       Allergies   Allergen Reactions     Sulfate        Review of Systems:    -Constitutional: The patient is feeling generally well.    Physical exam:  Vitals: There were no vitals taken for this visit.  GEN: This is a well developed, well-nourished female in no acute distress, in a pleasant mood.    SKIN: Focused examination of the arms was performed.  - Spiny keratotic papules and erythema on the upper arms  -scar on the chest and back  - No other lesions of concern on areas examined.       Impression/Plan:  1. Keratosis Pilaris - upper arms     Recommend hydrocortisone or AmLactin     2. Hypertrophic scar    See PDL and CORE procedure note  Kenalog topical note: After verbal consent , after CO2 total cc of Kenalog 2.5 mg/cc wasapplid topically after Co2 laser on the hypertrophic scar portion of the back.  The patient tolerated the procedure well and left the Dermatology clinic in good condition.          Follow-up in [facility] for new or changing lesions.       Staff Involved:  Scribe/Staff    Scribe Disclosure  I, Scarlett White, am serving as a scribe to document services personally performed by Dr. Carin Melendez MD, based on data collection and the provider's statements to me.      Provider Disclosure:   The documentation recorded by the scribe accurately reflects the services I personally performed and the decisions made by me.    Carin Melendez MD    Department of Dermatology  [facility]: Phone: [phone], Fax:[phone]  [facility]: Phone: [phone], Fax: [phone]

## 2019-06-26 NOTE — PROCEDURES
Laser- VBeam(Pulsed Dye Laser) Procedure Note: Medical      Procedure Date: 2019    Attending Staff Surgeon: Dr. Carin Melendez MD    Resident Surgeon: Dequan Byrd MD    Assistant: Mohini Diane RN    Operating Room Data:     Surgery/Procedure Date:    SAME     Pre-operative Diagnosis:   Hypertrophic scar  Location: Right upper back and right chest  Number of lesions: 2    Operation/Procedure    Vbeam pulsed dye laser treatment#: 5    Post-operative Diagnosis:  SAME    Laser Settings:  Energy: 4.5 J/cm2  Spot size: 7mm  Pulse width:  1.5 mS (0.45 thru 40 mS)  Dynamic cooling spray settin mS  Dynamic cooling device delay:  20 mS      Anesthesia:  BLT    Description of Operation/Procedure:   The nature and purpose of the procedure, associated risks, possible consequences, complications and alternative methods of treatment were explained in detail, this includes but is not limited to hyperpigmentation, hypopigmentation, scarring, bruising, hair loss pain/discomfort, eye injury, and blister. We reviewed that the outcome could be any of the following: no improvement, slight improvement or change in skin color & texture, the skin might be permanently lighter or darker, and though uncommon, superficial scarring may occur.  Multiple treatments may be recommended.     A photo and operative consent were obtained. Time-out was performed.The patient was positioned to optimally expose the area treated. Protective eyewear was worn by the patient and goggles on all personnel in the treatment room. The patient confirmed the site to be treated. The laser energy output was verified by meter reading.      The clinically evident lesion(s) was/were treated with Ceci Vbeam pulsed dye laser (595 nm) beam as above. A total of 30 pulses were used. The patient tolerated the procedure well and no complications were noted. Post operative instructions were provided. The total laser operation and preparation time was 5  minutes.      The patient will follow-up in in 6 months, she will call needed.     -------------------------------------------------------------------------------------------------------------------------------------------------------------    CORE Procedure: Medical    Procedure Date: 06/26/2019  Staff: Dr. Carin Melendez MD  Resident/Fellow:  Dequan Byrd MD  Assistant: Mohini Diane RN    Procedure:   CO2RE, fractional CO2 ablative laser treatment: 4th treatment    Anesthesia and Premedication:  Anesthesia (topical): Benzocaine 20%/Lidocaine 8%/Tetracaine 4% ointment    Device Settings:  Diagnosis: scar  Location: right upper back   Mode(class/deep/mid/etc): deep  Frational coverage(%): 3% (back)  Core(mJ): 70 (back)      Description of Procedure:   The nature and purpose of the procedure, associated risks, possible consequences, complications, and alternative methods of treatment were explained in detail including but not limited to redness, swelling, peeling of the skin, eye injury, infection, bleeding, oozing, heat sensation, itching or acne.  Possible outcomes were reviewed including the following: no improvement, slight improvement, skin lightening or darkening. The possibility of permanent scarring was reviewed. Discussion that multiple treatments may be required was completed.     Photo consent was obtained and reviewed, a time out was performed, and informed consent was obtained.  The area was cleansed with [employer]. Protective eyewear was worn by the patient and all personnel in the treatment room  The patient confirmed the site to be treated. The laser energy output was verified by meter reading. The [place] cooler and smoke evacuator devices were operated coincident with the CO2RE laser. The areas were treated with CO2RE laser as described.   Cold compresses then  aquaphor was applied in a thin layer. The patient tolerated the procedure well and no complications were noted. Verbal and written  aftercare instructions were provided. Post procedure care including use of mild, gentle cleansers and moisturizers  for the first week following treatment was reviewed. The patient was recommend application of at least SPF 30 sunscreen daily  and avoidance of direct sunlight up to 3 months post procedure.  . The patient was discharged from the dermatology clinic in good condition.    The patient will follow up with nursing in 48 hours and with MD in 6months  Staff Involved:  Scribe/Staff    Scribe Disclosure  I, Scarlett White, am serving as a scribe to document services personally performed by Dr. Carin Melendez MD, based on data collection and the provider's statements to me.     Provider Disclosure:   The documentation recorded by the scribe accurately reflects the services I personally performed and the decisions made by me.    Carin Melendez MD    Department of Dermatology  [facility]: Phone: [phone], Fax:[phone]  [facility]: Phone: [phone], Fax: [phone]

## 2019-07-21 DIAGNOSIS — Z30.41 ENCOUNTER FOR SURVEILLANCE OF CONTRACEPTIVE PILLS: ICD-10-CM

## 2019-07-21 NOTE — TELEPHONE ENCOUNTER
"Seasonale 0.15-0.03 MG  Last Written Prescription Date:  05/18/2018  Last Fill Quantity: 91,  # refills: 4   Last office visit: 5/18/2018 with prescribing provider:  Yes    Future Office Visit:    Requested Prescriptions   Pending Prescriptions Disp Refills     levonorgestrel-ethinyl estradiol (SEASONALE) 0.15-0.03 MG tablet [Pharmacy Med Name: LEVONOR-ETH ESTRAD 0.15-0.03] 91 tablet 4     Sig: TAKE 1 TABLET BY MOUTH EVERY DAY       Contraceptives Protocol Failed - 7/21/2019 11:48 AM        Failed - Recent (12 mo) or future (30 days) visit within the authorizing provider's specialty     Patient had office visit in the last 12 months or has a visit in the next 30 days with authorizing provider or within the authorizing provider's specialty.  See \"Patient Info\" tab in inbasket, or \"Choose Columns\" in Meds & Orders section of the refill encounter.              Passed - Patient is not a current smoker if age is 35 or older        Passed - Medication is active on med list        Passed - No active pregnancy on record        Passed - No positive pregnancy test in past 12 months          "

## 2019-07-23 RX ORDER — LEVONORGESTREL AND ETHINYL ESTRADIOL 0.15-0.03
KIT ORAL
Qty: 28 TABLET | Refills: 0 | Status: SHIPPED | OUTPATIENT
Start: 2019-07-23 | End: 2019-07-25

## 2019-07-25 ENCOUNTER — MYC MEDICAL ADVICE (OUTPATIENT)
Dept: FAMILY MEDICINE | Facility: CLINIC | Age: 24
End: 2019-07-25

## 2019-07-25 ENCOUNTER — OFFICE VISIT (OUTPATIENT)
Dept: FAMILY MEDICINE | Facility: CLINIC | Age: 24
End: 2019-07-25
Payer: COMMERCIAL

## 2019-07-25 ENCOUNTER — RESULT FOLLOW UP (OUTPATIENT)
Dept: FAMILY MEDICINE | Facility: CLINIC | Age: 24
End: 2019-07-25

## 2019-07-25 VITALS
SYSTOLIC BLOOD PRESSURE: 108 MMHG | TEMPERATURE: 98.5 F | HEIGHT: 65 IN | BODY MASS INDEX: 24.16 KG/M2 | HEART RATE: 70 BPM | RESPIRATION RATE: 12 BRPM | WEIGHT: 145 LBS | DIASTOLIC BLOOD PRESSURE: 70 MMHG

## 2019-07-25 DIAGNOSIS — J45.990 EXERCISE-INDUCED ASTHMA: ICD-10-CM

## 2019-07-25 DIAGNOSIS — Z30.41 ENCOUNTER FOR SURVEILLANCE OF CONTRACEPTIVE PILLS: ICD-10-CM

## 2019-07-25 DIAGNOSIS — Z00.00 ROUTINE GENERAL MEDICAL EXAMINATION AT A HEALTH CARE FACILITY: Primary | ICD-10-CM

## 2019-07-25 DIAGNOSIS — Z12.4 CERVICAL CANCER SCREENING: ICD-10-CM

## 2019-07-25 PROCEDURE — 87624 HPV HI-RISK TYP POOLED RSLT: CPT | Performed by: PHYSICIAN ASSISTANT

## 2019-07-25 PROCEDURE — G0124 SCREEN C/V THIN LAYER BY MD: HCPCS | Performed by: PHYSICIAN ASSISTANT

## 2019-07-25 PROCEDURE — 99395 PREV VISIT EST AGE 18-39: CPT | Performed by: PHYSICIAN ASSISTANT

## 2019-07-25 PROCEDURE — G0145 SCR C/V CYTO,THINLAYER,RESCR: HCPCS | Performed by: PHYSICIAN ASSISTANT

## 2019-07-25 RX ORDER — ALBUTEROL SULFATE 90 UG/1
2 AEROSOL, METERED RESPIRATORY (INHALATION) EVERY 6 HOURS
Qty: 1 INHALER | Refills: 11 | Status: SHIPPED | OUTPATIENT
Start: 2019-07-25

## 2019-07-25 RX ORDER — LEVONORGESTREL AND ETHINYL ESTRADIOL 0.15-0.03
1 KIT ORAL DAILY
Qty: 28 TABLET | Refills: 0 | Status: SHIPPED | OUTPATIENT
Start: 2019-07-25 | End: 2020-01-06

## 2019-07-25 ASSESSMENT — MIFFLIN-ST. JEOR: SCORE: 1403.6

## 2019-07-25 NOTE — PROGRESS NOTES
SUBJECTIVE:   CC: Mayi Aleman is an 24 year old woman who presents for preventive health visit.     Healthy Habits:     Getting at least 3 servings of Calcium per day:  Yes    Bi-annual eye exam:  Yes    Dental care twice a year:  Yes    Sleep apnea or symptoms of sleep apnea:  None    Diet:  Regular (no restrictions)    Frequency of exercise:  2-3 days/week    Duration of exercise:  30-45 minutes    Taking medications regularly:  Yes    Medication side effects:  None    PHQ-2 Total Score: 0    Additional concerns today:  No    25 y/o new to me female here for well exam.  She has been feeling well, no medical c/o.  She works as RN, going well.  She does take birth control continuous.  Was prone to ovarian cysts, has not had period in over 1 year.  No pelvic pain.    Uses albuterol prior to exercise, works well, no issues.          Today's PHQ-2 Score:   PHQ-2 ( 1999 Pfizer) 7/24/2019   Q1: Little interest or pleasure in doing things 0   Q2: Feeling down, depressed or hopeless 0   PHQ-2 Score 0   Q1: Little interest or pleasure in doing things Not at all   Q2: Feeling down, depressed or hopeless Not at all   PHQ-2 Score 0       Abuse: Current or Past(Physical, Sexual or Emotional)- No  Do you feel safe in your environment? Yes    Social History     Tobacco Use     Smoking status: Never Smoker     Smokeless tobacco: Never Used     Tobacco comment: Mom smokes outside   Substance Use Topics     Alcohol use: No         Alcohol Use 7/24/2019   Prescreen: >3 drinks/day or >7 drinks/week? No   Prescreen: >3 drinks/day or >7 drinks/week? -       Reviewed orders with patient.  Reviewed health maintenance and updated orders accordingly - Yes  BP Readings from Last 3 Encounters:   07/25/19 108/70   05/18/18 119/78   09/26/17 124/80    Wt Readings from Last 3 Encounters:   07/25/19 65.8 kg (145 lb)   05/18/18 65.5 kg (144 lb 6.4 oz)   09/26/17 62.2 kg (137 lb 3.2 oz)                    Mammogram not appropriate for  "this patient based on age.    Pertinent mammograms are reviewed under the imaging tab.  History of abnormal Pap smear: NO - age 21-29 PAP every 3 years recommended  PAP / HPV 3/15/2016 1/16/2013   PAP NIL NIL     Reviewed and updated as needed this visit by clinical staff  Tobacco  Allergies  Meds         Reviewed and updated as needed this visit by Provider            Review of Systems  CONSTITUTIONAL: NEGATIVE for fever, chills, change in weight  INTEGUMENTARU/SKIN: NEGATIVE for worrisome rashes, moles or lesions  EYES: NEGATIVE for vision changes or irritation  ENT: NEGATIVE for ear, mouth and throat problems  RESP: NEGATIVE for significant cough or SOB  BREAST: NEGATIVE for masses, tenderness or discharge  CV: NEGATIVE for chest pain, palpitations or peripheral edema  GI: NEGATIVE for nausea, abdominal pain, heartburn, or change in bowel habits  : NEGATIVE for unusual urinary or vaginal symptoms. Periods are regular.  MUSCULOSKELETAL: NEGATIVE for significant arthralgias or myalgia  NEURO: NEGATIVE for weakness, dizziness or paresthesias  PSYCHIATRIC: NEGATIVE for changes in mood or affect     OBJECTIVE:   /70   Pulse 70   Temp 98.5  F (36.9  C) (Oral)   Resp 12   Ht 1.651 m (5' 5\")   Wt 65.8 kg (145 lb)   Breastfeeding? No   BMI 24.13 kg/m    Physical Exam  GENERAL: alert and no distress  EYES: Eyes grossly normal to inspection, PERRL and conjunctivae and sclerae normal  HENT: ear canals and TM's normal, nose and mouth without ulcers or lesions  NECK: no adenopathy, no asymmetry, masses, or scars and thyroid normal to palpation  RESP: lungs clear to auscultation - no rales, rhonchi or wheezes  CV: regular rate and rhythm, normal S1 S2, no S3 or S4, no murmur, click or rub, no peripheral edema and peripheral pulses strong  ABDOMEN: soft, nontender, no hepatosplenomegaly, no masses and bowel sounds normal   (female): normal female external genitalia, normal urethral meatus , vaginal mucosa " "pink, moist, well rugated and cervix has fair amount of white mucous, and very friable with brush collection.  MS: no gross musculoskeletal defects noted, no edema  SKIN: no suspicious lesions or rashes  NEURO: Normal strength and tone, mentation intact and speech normal  PSYCH: mentation appears normal, affect normal/bright    Diagnostic Test Results:  Labs reviewed in Epic    ASSESSMENT/PLAN:   1. Routine general medical examination at a health care facility  Doing well.    2. Encounter for surveillance of contraceptive pills    - levonorgestrel-ethinyl estradiol (SEASONALE) 0.15-0.03 MG tablet; Take 1 tablet by mouth daily  Dispense: 28 tablet; Refill: 0    3. Cervical cancer screening  She is not having any pelvic pain or dyspareunia to correlate with mucous and easy bleeding.  Possible ectropion.   - Pap imaged thin layer screen reflex to HPV if ASCUS - recommend age 25 - 29    4. Exercise-induced asthma    - albuterol (PROAIR HFA/PROVENTIL HFA/VENTOLIN HFA) 108 (90 Base) MCG/ACT inhaler; Inhale 2 puffs into the lungs every 6 hours  Dispense: 1 Inhaler; Refill: 11    COUNSELING:  Reviewed preventive health counseling, as reflected in patient instructions       Regular exercise       Healthy diet/nutrition       Contraception    Estimated body mass index is 24.13 kg/m  as calculated from the following:    Height as of this encounter: 1.651 m (5' 5\").    Weight as of this encounter: 65.8 kg (145 lb).         reports that she has never smoked. She has never used smokeless tobacco.      Counseling Resources:  ATP IV Guidelines  Pooled Cohorts Equation Calculator  Breast Cancer Risk Calculator  FRAX Risk Assessment  ICSI Preventive Guidelines  Dietary Guidelines for Americans, 2010  [employer] MyPlate  ASA Prophylaxis  Lung CA Screening    Oneal Partida PA-C  [facility]  "

## 2019-07-26 ASSESSMENT — ASTHMA QUESTIONNAIRES: ACT_TOTALSCORE: 22

## 2019-08-01 ENCOUNTER — MYC MEDICAL ADVICE (OUTPATIENT)
Dept: FAMILY MEDICINE | Facility: CLINIC | Age: 24
End: 2019-08-01

## 2019-08-01 ENCOUNTER — TELEPHONE (OUTPATIENT)
Dept: FAMILY MEDICINE | Facility: CLINIC | Age: 24
End: 2019-08-01

## 2019-08-01 DIAGNOSIS — J45.990 EXERCISE-INDUCED ASTHMA: Primary | ICD-10-CM

## 2019-08-01 NOTE — TELEPHONE ENCOUNTER
Per [employer] insurance requesting alternative therapy, will not cover    albuterol (PROAIR HFA/PROVENTIL HFA/VENTOLIN HFA) 108 (90 Base) MCG/ACT inhaler    Contacted patient via [employer] to check with insurance and get back to us with which med is in formulary.    Alessandra Easton RN

## 2019-08-03 LAB
COPATH REPORT: ABNORMAL
PAP: ABNORMAL

## 2019-08-05 LAB
FINAL DIAGNOSIS: ABNORMAL
HPV HR 12 DNA CVX QL NAA+PROBE: POSITIVE
HPV16 DNA SPEC QL NAA+PROBE: NEGATIVE
HPV18 DNA SPEC QL NAA+PROBE: NEGATIVE
SPECIMEN DESCRIPTION: ABNORMAL
SPECIMEN SOURCE CVX/VAG CYTO: ABNORMAL

## 2019-08-05 NOTE — TELEPHONE ENCOUNTER
Pt read [employer] message but hasn't responded  Sent f/u MyChart message to pt to see if she's determine what's covered yet  Shama HELLER RN

## 2019-08-06 PROBLEM — R87.610 ASCUS WITH POSITIVE HIGH RISK HPV CERVICAL: Status: ACTIVE | Noted: 2019-07-25

## 2019-08-06 PROBLEM — R87.810 ASCUS WITH POSITIVE HIGH RISK HPV CERVICAL: Status: ACTIVE | Noted: 2019-07-25

## 2019-08-06 RX ORDER — ALBUTEROL SULFATE 90 UG/1
2 AEROSOL, METERED RESPIRATORY (INHALATION) EVERY 6 HOURS
Qty: 1 INHALER | Refills: 11 | Status: SHIPPED | OUTPATIENT
Start: 2019-08-06

## 2019-08-06 NOTE — PROGRESS NOTES
07/25/19: ASCUS Pap, + HR HPV (not 16 or 18) result. Plan [place], due bef 10/25/19.   08/06/19:Msg left to call back.   08/7/19: Pt was advised.  08/21/19: [place] Normal appearing cervix, no bx's taken. Plan cotest in 1 year. Due by 07/25/20. Provider advised the pt.

## 2019-08-21 ENCOUNTER — OFFICE VISIT (OUTPATIENT)
Dept: FAMILY MEDICINE | Facility: CLINIC | Age: 24
End: 2019-08-21
Payer: COMMERCIAL

## 2019-08-21 VITALS
HEART RATE: 82 BPM | BODY MASS INDEX: 24.92 KG/M2 | OXYGEN SATURATION: 98 % | DIASTOLIC BLOOD PRESSURE: 89 MMHG | WEIGHT: 146 LBS | SYSTOLIC BLOOD PRESSURE: 136 MMHG | HEIGHT: 64 IN | RESPIRATION RATE: 16 BRPM | TEMPERATURE: 98.9 F

## 2019-08-21 DIAGNOSIS — R87.610 ASCUS WITH POSITIVE HIGH RISK HPV CERVICAL: Primary | ICD-10-CM

## 2019-08-21 DIAGNOSIS — R87.810 ASCUS WITH POSITIVE HIGH RISK HPV CERVICAL: Primary | ICD-10-CM

## 2019-08-21 LAB — HCG UR QL: NEGATIVE

## 2019-08-21 PROCEDURE — 57452 EXAM OF CERVIX W/SCOPE: CPT | Performed by: FAMILY MEDICINE

## 2019-08-21 PROCEDURE — 81025 URINE PREGNANCY TEST: CPT | Performed by: FAMILY MEDICINE

## 2019-08-21 ASSESSMENT — MIFFLIN-ST. JEOR: SCORE: 1392.25

## 2019-08-21 NOTE — PATIENT INSTRUCTIONS
Patient Education     Colposcopy  Colposcopy is a procedure that gives your healthcare provider a magnified view of your cervix. It is done using a lighted microscope called a colposcope. In most cases, a sample of cervical cells is taken during a biopsy. The sample can then be studied in a lab. If any problems are found, you and your healthcare provider will discuss treatment options. It usually takes less than 30 minutes, and you can often go back to your normal routine right away.  Reasons for the procedure  Colposcopy is usually done as a follow-up exam to help find the cause of an abnormal Pap test. Results of an abnormal Pap test can mean that the cells don t appear normal or that there are cancer cells. Abnormal cells can also be caused by infections. HPV (human papillomavirus) is a large family of viruses that can be passed from person to person through sex. HPV can cause genital warts. It can also cause changes in cervical cells. If an HPV test is positive and the Pap test is abnormal, a colposcopy may be recommended. Colposcopy is also used to evaluate other problems. These include pain or bleeding during sex, or a sore (lesion) on the vulva or vagina.  What are the risks?  Problems after colposcopy are very rare, but can include:    Bleeding (if a biopsy is done)    Infection  Getting ready for the procedure  Colposcopy is normally done in your healthcare provider s office. It will be scheduled for a time when you re not having your menstrual period. You may be asked to sign a form giving your consent to have the procedure. A day or 2 before the procedure, your healthcare provider may also ask you to:    Not have sex    Stop using tampons    Not use creams or other vaginal medicines    Not clean out the vagina with water or other fluids (douche)    Take over-the-counter pain medicines an hour or 2 before the procedure  During colposcopy    You will be asked to lie on an exam table with your knees bent,  just as you do for a Pap test.    A tool called a speculum is inserted into the vagina to hold it open.    A vinegar solution is applied to the cervix to make the abnormal cells easier to see. You may feel pressure or a slight burning for a few moments. In some cases, the cervix may be numbed first with an anesthetic.    The cervix is looked at through the colposcope, which is placed outside the vagina.    If your healthcare provider sees abnormal areas on your cervix, a biopsy will be done. The tissue sample is sent to a lab for study.    An endocervical curettage may also be done at the time of colposcopy. In this procedure, a tool is put into the endocervical canal to get a sample of cells from the endocervix. This area can't be seen with a colposcope.     You may feel slight pinching or cramping during the biopsy. Medicine may be applied to the biopsy site to stop bleeding.  After the procedure    If you feel lightheaded or dizzy, you can rest on the table until you re ready to get dressed.    If a biopsy was done, you may have mild cramping or light bleeding for a few days. You may also have discharge from the medicine used to stop bleeding at the biopsy site.    Use pads, not tampons, for at least the first 24 hours.    If you have any discomfort, over-the-counter pain medicine can provide relief.    Ask your healthcare provider when you can have sex again.  Follow-up  If a biopsy was done, your healthcare provider will get the lab report in a week or 2. You and your healthcare provider can then discuss the results. In some cases, you may be scheduled for more tests or treatment. Be sure to keep follow-up appointments with your healthcare provider.  When to call your healthcare provider  Call your healthcare provider if you have:    Heavy vaginal bleeding (more than a pad an hour for 2 hours)    Severe or increasing pelvic pain    A fever over 100.4 F (38 C), or as directed by your provider    Bad-smelling or  unusual vaginal discharge   Date Last Reviewed: 6/1/2017 2000-2018 [employer]. [address] [place] [address], [place] [address]. All rights reserved. This information is not intended as a substitute for professional medical care. Always follow your healthcare professional's instructions.

## 2019-08-21 NOTE — PROGRESS NOTES
Subjective     Mayi Aleman is a 24 year old female who presents to clinic today for the following health issues:    HPI   Colposcopy    Colposcopy/LEEP  Date/Time: 8/21/2019 1:35 PM  Performed by: Susan Wilson MD  Authorized by: Susan Wilson MD     Consent:     Consent obtained:  Verbal and written    Consent given by:  Patient    Procedural risks discussed:  Bleeding, failure rate, infection, repeat procedure, possible continued pain and damage to other organs    Patient questions answered: yes      Patient agrees, verbalizes understanding, and wants to proceed: yes      Educational handouts given: yes      Instructions and paperwork completed: yes    Pre-procedure:     Pre-procedure timeout performed: yes      Premeds:  Ibuprofen    Prepped with: acetic acid and Lugol    Indication:     Indication:  HPV and ASC-US    HPV Indications:  Other high risk  Procedure:     Procedure: Colposcopy of vagina w/ biopsy      Under satisfactory analgesia the patient was prepped and draped in the dorsal lithotomy position: yes      [place] speculum was placed in the vagina: yes      Under colposcopic examination the transition zone was seen in entirety: yes      Intracervical block was performed: no      Tampon inserted: no      Monsel's solution was applied: no      Biopsy(s): no      Specimen to pathology: no    Post-procedure:     Findings: White epithelium      Findings comment:  Minimal changes related to HPV. Bx not indicated. Cervical Extropion     Impression: Normal appearing cervix          ICD-10-CM    1. ASCUS with positive high risk HPV cervical R87.610 HCG qualitative urine    R87.810 Colposcopy/LEEP     Susan Wilson MD  [facility]  PAGER: [phone] or (W): [phone]

## 2019-08-21 NOTE — NURSING NOTE
"Chief Complaint   Patient presents with     Colposcopy     initial /89 (BP Location: Left arm, Cuff Size: Adult Regular)   Pulse 82   Temp 98.9  F (37.2  C) (Oral)   Resp 16   Ht 1.626 m (5' 4\")   Wt 66.2 kg (146 lb)   SpO2 98%   BMI 25.06 kg/m   Estimated body mass index is 25.06 kg/m  as calculated from the following:    Height as of this encounter: 1.626 m (5' 4\").    Weight as of this encounter: 66.2 kg (146 lb).  BP completed using cuff size: regular.  Right arm      Health Maintenance that is potentially due pending provider review:  NONE    n/a    Quincy Montemayor ma  "

## 2019-11-05 ENCOUNTER — HEALTH MAINTENANCE LETTER (OUTPATIENT)
Age: 24
End: 2019-11-05

## 2020-01-05 DIAGNOSIS — Z30.41 ENCOUNTER FOR SURVEILLANCE OF CONTRACEPTIVE PILLS: ICD-10-CM

## 2020-01-06 ENCOUNTER — MYC MEDICAL ADVICE (OUTPATIENT)
Dept: FAMILY MEDICINE | Facility: CLINIC | Age: 25
End: 2020-01-06

## 2020-01-06 RX ORDER — LEVONORGESTREL AND ETHINYL ESTRADIOL 0.15-0.03
KIT ORAL
Qty: 91 TABLET | Refills: 1 | Status: SHIPPED | OUTPATIENT
Start: 2020-01-06 | End: 2020-06-30

## 2020-01-06 NOTE — TELEPHONE ENCOUNTER
[employer] message sent by patient:  No I have been consistently taking it, they usually give me 3 months worth of birth control for one prescription, and usually have 3 refills until I have to request again.     Prescription approved per [facility] [place].  Keily Quan RN

## 2020-01-06 NOTE — TELEPHONE ENCOUNTER
One month sent 7/25/19 with no refills  Last Written Prescription Date:  7/25/2019  Last Fill Quantity: 28,  # refills: 0  Last office visit: 8/21/2019 with prescribing provider:  REBECCA Helms  Future Office Visit:      [email or web address] message sent to patient.  Should have needed refill sooner.  Keily Quan RN

## 2020-06-29 DIAGNOSIS — Z30.41 ENCOUNTER FOR SURVEILLANCE OF CONTRACEPTIVE PILLS: ICD-10-CM

## 2020-06-30 RX ORDER — LEVONORGESTREL AND ETHINYL ESTRADIOL 0.15-0.03
KIT ORAL
Qty: 61 TABLET | Refills: 0 | Status: SHIPPED | OUTPATIENT
Start: 2020-06-30 | End: 2020-09-24

## 2020-06-30 NOTE — TELEPHONE ENCOUNTER
"Last Written Prescription Date:  1/6/2020  Last Fill Quantity: 91,  # refills: 1   Last office visit: 8/21/2019 with prescribing provider:  REBECCA  Last physical 7/25/19  Future Office Visit:      Prescription approved per [facility] Refill Protocol.  Due for physical end of July.  Keily Quan RN    Requested Prescriptions   Signed Prescriptions Disp Refills    levonorgestrel-ethinyl estradiol (SEASONALE) 0.15-0.03 MG tablet 61 tablet 0     Sig: Take 1 tablet by mouth once daily.       Contraceptives Protocol Passed - 6/29/2020  7:59 AM        Passed - Patient is not a current smoker if age is 35 or older        Passed - Recent (12 mo) or future (30 days) visit within the authorizing provider's specialty     Patient has had an office visit with the authorizing provider or a provider within the authorizing providers department within the previous 12 mos or has a future within next 30 days. See \"Patient Info\" tab in inbasket, or \"Choose Columns\" in Meds & Orders section of the refill encounter.              Passed - Medication is active on med list        Passed - No active pregnancy on record        Passed - No positive pregnancy test in past 12 months                 "

## 2020-07-01 ENCOUNTER — MYC REFILL (OUTPATIENT)
Dept: FAMILY MEDICINE | Facility: CLINIC | Age: 25
End: 2020-07-01

## 2020-07-01 DIAGNOSIS — Z30.41 ENCOUNTER FOR SURVEILLANCE OF CONTRACEPTIVE PILLS: ICD-10-CM

## 2020-07-01 RX ORDER — LEVONORGESTREL AND ETHINYL ESTRADIOL 0.15-0.03
1 KIT ORAL DAILY
Qty: 61 TABLET | Refills: 0 | Status: CANCELLED | OUTPATIENT
Start: 2020-07-01

## 2020-08-24 ENCOUNTER — MYC MEDICAL ADVICE (OUTPATIENT)
Dept: FAMILY MEDICINE | Facility: CLINIC | Age: 25
End: 2020-08-24

## 2020-08-24 DIAGNOSIS — Z11.59 SCREENING FOR VIRAL DISEASE: Primary | ICD-10-CM

## 2020-08-25 NOTE — TELEPHONE ENCOUNTER
JS,    Please address due to LS absence.  Please see sofatronic message below.    Order T'd up.  Please send back to triage and I will let patient know who to call to schedule.    Thanks,  Keily Quan RN

## 2020-09-10 DIAGNOSIS — Z11.59 SCREENING FOR VIRAL DISEASE: ICD-10-CM

## 2020-09-10 PROCEDURE — U0003 INFECTIOUS AGENT DETECTION BY NUCLEIC ACID (DNA OR RNA); SEVERE ACUTE RESPIRATORY SYNDROME CORONAVIRUS 2 (SARS-COV-2) (CORONAVIRUS DISEASE [COVID-19]), AMPLIFIED PROBE TECHNIQUE, MAKING USE OF HIGH THROUGHPUT TECHNOLOGIES AS DESCRIBED BY CMS-2020-01-R: HCPCS | Performed by: PHYSICIAN ASSISTANT

## 2020-09-11 LAB
SARS-COV-2 RNA SPEC QL NAA+PROBE: NOT DETECTED
SPECIMEN SOURCE: NORMAL

## 2020-10-09 ENCOUNTER — OFFICE VISIT (OUTPATIENT)
Dept: FAMILY MEDICINE | Facility: CLINIC | Age: 25
End: 2020-10-09
Payer: COMMERCIAL

## 2020-10-09 VITALS
BODY MASS INDEX: 25.95 KG/M2 | DIASTOLIC BLOOD PRESSURE: 88 MMHG | HEART RATE: 76 BPM | SYSTOLIC BLOOD PRESSURE: 135 MMHG | TEMPERATURE: 98.1 F | HEIGHT: 64 IN | RESPIRATION RATE: 16 BRPM | WEIGHT: 152 LBS | OXYGEN SATURATION: 97 %

## 2020-10-09 DIAGNOSIS — R87.810 ASCUS WITH POSITIVE HIGH RISK HPV CERVICAL: ICD-10-CM

## 2020-10-09 DIAGNOSIS — R87.610 ASCUS WITH POSITIVE HIGH RISK HPV CERVICAL: ICD-10-CM

## 2020-10-09 DIAGNOSIS — Z00.00 ROUTINE GENERAL MEDICAL EXAMINATION AT A HEALTH CARE FACILITY: Primary | ICD-10-CM

## 2020-10-09 DIAGNOSIS — Z30.41 ENCOUNTER FOR SURVEILLANCE OF CONTRACEPTIVE PILLS: ICD-10-CM

## 2020-10-09 LAB — HIV 1+2 AB+HIV1 P24 AG SERPL QL IA: NONREACTIVE

## 2020-10-09 PROCEDURE — G0124 SCREEN C/V THIN LAYER BY MD: HCPCS | Performed by: FAMILY MEDICINE

## 2020-10-09 PROCEDURE — G0145 SCR C/V CYTO,THINLAYER,RESCR: HCPCS | Performed by: FAMILY MEDICINE

## 2020-10-09 PROCEDURE — 99395 PREV VISIT EST AGE 18-39: CPT | Performed by: FAMILY MEDICINE

## 2020-10-09 PROCEDURE — 87624 HPV HI-RISK TYP POOLED RSLT: CPT | Performed by: FAMILY MEDICINE

## 2020-10-09 PROCEDURE — 87389 HIV-1 AG W/HIV-1&-2 AB AG IA: CPT | Performed by: FAMILY MEDICINE

## 2020-10-09 PROCEDURE — 36415 COLL VENOUS BLD VENIPUNCTURE: CPT | Performed by: FAMILY MEDICINE

## 2020-10-09 RX ORDER — LEVONORGESTREL AND ETHINYL ESTRADIOL 0.15-0.03
1 KIT ORAL DAILY
Qty: 61 TABLET | Refills: 5 | Status: SHIPPED | OUTPATIENT
Start: 2020-10-09

## 2020-10-09 ASSESSMENT — MIFFLIN-ST. JEOR: SCORE: 1418.44

## 2020-10-09 NOTE — LETTER
My Asthma Action Plan    Name: Mayi Aleman   YOB: 1995  Date: 10/13/2020   My doctor: Susna Wilson MD   My clinic: [facility]        My Rescue Medicine:   Albuterol inhaler (Proair/Ventolin/Proventil HFA)  2-4 puffs EVERY 4 HOURS as needed. Use a spacer if recommended by your provider.   My Asthma Severity:   Intermittent / Exercise Induced  Know your asthma triggers: upper respiratory infections and pollens             GREEN ZONE   Good Control    I feel good    No cough or wheeze    Can work, sleep and play without asthma symptoms       Take your asthma control medicine every day.     1. If exercise triggers your asthma, take your rescue medication    15 minutes before exercise or sports, and    During exercise if you have asthma symptoms  2. Spacer to use with inhaler: If you have a spacer, make sure to use it with your inhaler             YELLOW ZONE Getting Worse  I have ANY of these:    I do not feel good    Cough or wheeze    Chest feels tight    Wake up at night   1. Keep taking your Green Zone medications  2. Start taking your rescue medicine:    every 20 minutes for up to 1 hour. Then every 4 hours for 24-48 hours.  3. If you stay in the Yellow Zone for more than 12-24 hours, contact your doctor.  4. If you do not return to the Green Zone in 12-24 hours or you get worse, start taking your oral steroid medicine if prescribed by your provider.           RED ZONE Medical Alert - Get Help  I have ANY of these:    I feel awful    Medicine is not helping    Breathing getting harder    Trouble walking or talking    Nose opens wide to breathe       1. Take your rescue medicine NOW  2. If your provider has prescribed an oral steroid medicine, start taking it NOW  3. Call your doctor NOW  4. If you are still in the Red Zone after 20 minutes and you have not reached your doctor:    Take your rescue medicine again and    Call 911 or go to the emergency room right  away    See your regular doctor within 2 weeks of an Emergency Room or Urgent Care visit for follow-up treatment.          Annual Reminders:  Meet with Asthma Educator,  Flu Shot in the Fall, consider Pneumonia Vaccination for patients with asthma (aged 19 and older).    Pharmacy:    [id number] - [place], [place] - [address] AT [facility] [place] - [address] [place] [address] [id number] [place] [address] [facility] [place] [id number]  [facility] [place] [address] [facility] [place] [address] [place] [id number] [address] [facility] [address] AT [facility] FROM [facility] [address] [place] [address]    Electronically signed by Susan Wilson MD   Date: 10/13/20                    Asthma Triggers  How To Control Things That Make Your Asthma Worse    Triggers are things that make your asthma worse.  Look at the list below to help you find your triggers and   what you can do about them. You can help prevent asthma flare-ups by staying away from your triggers.      Trigger                                                          What you can do   Cigarette Smoke  Tobacco smoke can make asthma worse. Do not allow smoking in your home, car or around you.  Be sure no one smokes at a child s day care or school.  If you smoke, ask your health care provider for ways to help you quit.  Ask family members to quit too.  Ask your health care provider for a referral to Quit Plan to help you quit smoking, or call 1-244-687-PLAN.     Colds, Flu, Bronchitis  These are common triggers of asthma. Wash your hands often.  Don t touch your eyes, nose or mouth.  Get a flu shot every year.      Dust Mites  These are tiny bugs that live in cloth or carpet. They are too small to see. Wash sheets and blankets in hot water every week.   Encase pillows and mattress in dust mite proof covers.  Avoid having carpet if you can. If you have carpet, vacuum weekly.   Use a dust mask and HEPA vacuum.   Pollen and Outdoor Mold  Some people are allergic to trees, grass, or weed pollen, or molds. Try to keep your windows closed.  Limit time out doors when pollen count is high.   Ask you health care provider about taking medicine during allergy season.     Animal Dander  Some people are allergic to skin flakes, urine or saliva from pets with fur or feathers. Keep pets with fur or feathers out of your home.    If you can t keep the pet outdoors, then keep the pet out of your bedroom.  Keep the bedroom door closed.  Keep pets off cloth furniture and away from stuffed toys.     Mice, Rats, and Cockroaches  Some people are allergic to the waste from these pests.   Cover food and garbage.  Clean up spills and food crumbs.  Store grease in the refrigerator.   Keep food out of the bedroom.   Indoor Mold  This can be a trigger if your home has high moisture. Fix leaking faucets, pipes, or other sources of water.   Clean moldy surfaces.  Dehumidify basement if it is damp and smelly.   Smoke, Strong Odors, and Sprays  These can reduce air quality. Stay away from strong odors and sprays, such as perfume, powder, hair spray, paints, smoke incense, paint, cleaning products, candles and new carpet.   Exercise or Sports  Some people with asthma have this trigger. Be active!  Ask your doctor about taking medicine before sports or exercise to prevent symptoms.    Warm up for 5-10 minutes before and after sports or exercise.     Other Triggers of Asthma  Cold air:  Cover your nose and mouth with a scarf.  Sometimes laughing or crying can be a trigger.  Some medicines and food can trigger asthma.

## 2020-10-09 NOTE — PROGRESS NOTES
SUBJECTIVE:   CC: Mayi Aleman is an 25 year old woman who presents for preventive health visit.       Patient has been advised of split billing requirements and indicates understanding: Yes  Healthy Habits:     Getting at least 3 servings of Calcium per day:  Yes    Bi-annual eye exam:  Yes    Dental care twice a year:  Yes    Sleep apnea or symptoms of sleep apnea:  None    Diet:  Regular (no restrictions)    Frequency of exercise:  2-3 days/week    Duration of exercise:  30-45 minutes    Taking medications regularly:  Yes    Medication side effects:  Not applicable    PHQ-2 Total Score: 0    Additional concerns today:  Yes    Occasional bleeding after sexual intercourse.     Hx of abnormal paps. Due for a repeat pap today.     Today's PHQ-2 Score:   PHQ-2 ( 1999 Pfizer) 10/9/2020   Q1: Little interest or pleasure in doing things 0   Q2: Feeling down, depressed or hopeless 0   PHQ-2 Score 0   Q1: Little interest or pleasure in doing things Not at all   Q2: Feeling down, depressed or hopeless Not at all   PHQ-2 Score 0       Abuse: Current or Past (Physical, Sexual or Emotional) - No  Do you feel safe in your environment? Yes    Social History     Tobacco Use     Smoking status: Never Smoker     Smokeless tobacco: Never Used     Tobacco comment: Mom smokes outside   Substance Use Topics     Alcohol use: Yes     Comment: social     If you drink alcohol do you typically have >3 drinks per day or >7 drinks per week? No    Alcohol Use 10/9/2020   Prescreen: >3 drinks/day or >7 drinks/week? No   Prescreen: >3 drinks/day or >7 drinks/week? -   No flowsheet data found.    Reviewed orders with patient.  Reviewed health maintenance and updated orders accordingly - Yes  Lab work is in process    Mammogram not appropriate for this patient based on age.    Pertinent mammograms are reviewed under the imaging tab.  History of abnormal Pap smear: YES - updated in Problem List and Health Maintenance accordingly  PAP /  "HPV Latest Ref Rng & Units 7/25/2019 3/15/2016 1/16/2013   PAP - ASC-US(A) NIL NIL   HPV 16 DNA NEG:Negative Negative - -   HPV 18 DNA NEG:Negative Negative - -   OTHER HR HPV NEG:Negative Positive(A) - -     Reviewed and updated as needed this visit by clinical staff  Tobacco  Allergies  Meds   Med Hx  Surg Hx  Fam Hx  Soc Hx        Reviewed and updated as needed this visit by Provider                    Review of Systems  CONSTITUTIONAL: NEGATIVE for fever, chills, change in weight  INTEGUMENTARU/SKIN: NEGATIVE for worrisome rashes, moles or lesions  EYES: NEGATIVE for vision changes or irritation  ENT: NEGATIVE for ear, mouth and throat problems  RESP: NEGATIVE for significant cough or SOB  BREAST: NEGATIVE for masses, tenderness or discharge  CV: NEGATIVE for chest pain, palpitations or peripheral edema  GI: NEGATIVE for nausea, abdominal pain, heartburn, or change in bowel habits  : NEGATIVE for unusual urinary or vaginal symptoms. Periods are regular.  MUSCULOSKELETAL: NEGATIVE for significant arthralgias or myalgia  NEURO: NEGATIVE for weakness, dizziness or paresthesias  PSYCHIATRIC: NEGATIVE for changes in mood or affect     OBJECTIVE:   /88   Pulse 76   Temp 98.1  F (36.7  C) (Tympanic)   Resp 16   Ht 1.632 m (5' 4.25\")   Wt 68.9 kg (152 lb)   SpO2 97%   BMI 25.89 kg/m    Physical Exam  GENERAL: healthy, alert and no distress  EYES: Eyes grossly normal to inspection, PERRL and conjunctivae and sclerae normal  HENT: ear canals and TM's normal, nose and mouth without ulcers or lesions  NECK: no adenopathy, no asymmetry, masses, or scars and thyroid normal to palpation  RESP: lungs clear to auscultation - no rales, rhonchi or wheezes  BREAST: normal without masses, tenderness or nipple discharge and no palpable axillary masses or adenopathy  CV: regular rate and rhythm, normal S1 S2, no S3 or S4, no murmur, click or rub, no peripheral edema and peripheral pulses strong  ABDOMEN: soft, " "nontender, no hepatosplenomegaly, no masses and bowel sounds normal   (female): normal female external genitalia, normal urethral meatus, vaginal mucosa pink, moist, well rugated, and normal cervix/adnexa/uterus without masses or discharge  MS: no gross musculoskeletal defects noted, no edema  SKIN: no suspicious lesions or rashes  NEURO: Normal strength and tone, mentation intact and speech normal  PSYCH: mentation appears normal, affect normal/bright    Diagnostic Test Results:  Labs reviewed in Epic    ASSESSMENT/PLAN:   1. Routine general medical examination at a health care facility  - HIV Antigen Antibody Combo    2. ASCUS with positive high risk HPV cervical  - Pap imaged thin layer screen reflex to HPV if ASCUS - recommend age 25 - 29  - HPV High Risk Types DNA Cervical    3. Encounter for surveillance of contraceptive pills  - levonorgestrel-ethinyl estradiol (SEASONALE) 0.15-0.03 MG tablet; Take 1 tablet by mouth daily  Dispense: 61 tablet; Refill: 5    Patient has been advised of split billing requirements and indicates understanding: Yes  COUNSELING:  Reviewed preventive health counseling, as reflected in patient instructions       Regular exercise       Healthy diet/nutrition    Estimated body mass index is 25.89 kg/m  as calculated from the following:    Height as of this encounter: 1.632 m (5' 4.25\").    Weight as of this encounter: 68.9 kg (152 lb).    She reports that she has never smoked. She has never used smokeless tobacco.      Counseling Resources:  ATP IV Guidelines  Pooled Cohorts Equation Calculator  Breast Cancer Risk Calculator  BRCA-Related Cancer Risk Assessment: FHS-7 Tool  FRAX Risk Assessment  ICSI Preventive Guidelines  Dietary Guidelines for Americans, 2010  USDA's MyPlate  ASA Prophylaxis  Lung CA Screening    Susan Wilson MD  [facility]  "

## 2020-10-09 NOTE — LETTER
October 27, 2020      Mayi Aleman  [address]  [place] [address]        Dear MsKori,    This letter is regarding your recent Pap smear and Human Papillomavirus (HPV) test.    Your results showed Atypical Squamous Cells of Undetermined Significance (ASCUS) and HPV positive.     About 80 percent of women have been exposed to HPV throughout their lifetime. There is no medication for the treatment of HPV. Typically, your own immune system gets rid of the virus before it does harm. HPV is spread by direct skin-to-skin contact, including sexual intercourse, oral sex, anal sex, or any other contact involving the genital area (example: hand to genital contact). It is not possible to become infected with HPV by touching an object, such as a toilet seat. Most people who are infected with HPV have no signs or symptoms.    Things that you can do to boost your immune system and help your body get rid of HPV: get plenty of rest, eat a well-balanced diet of healthy foods, stop smoking, exercise regularly and decrease stress.    It is recommended that you have your next Pap smear and Human Papillomavirus (HPV) test in 1 year.    If you have additional questions regarding this result, please contact our Registered Nurse, Priya, at [phone].      Sincerely,    Your [facility] Care Team

## 2020-10-10 ASSESSMENT — ASTHMA QUESTIONNAIRES: ACT_TOTALSCORE: 25

## 2020-10-15 LAB
COPATH REPORT: ABNORMAL
PAP: ABNORMAL

## 2020-10-20 ENCOUNTER — PATIENT OUTREACH (OUTPATIENT)
Dept: FAMILY MEDICINE | Facility: CLINIC | Age: 25
End: 2020-10-20

## 2020-10-20 NOTE — TELEPHONE ENCOUNTER
07/25/19: ASCUS Pap, + HR HPV (not 16 or 18) result. Plan [place].   08/21/19: [place] Normal appearing cervix, no bx's taken. Plan cotest in 1 year.   10/09/20: ASCUS Pap, + HR HPV (not 16 or 18). Plan cotest in 1 year, due 10/09/21.

## 2020-11-19 NOTE — RESULT ENCOUNTER NOTE
Dear Mayi    Your test results are attached, feel free to contact me via Marc Partida PA-C     Complex Repair Preamble Text (Leave Blank If You Do Not Want): Extensive wide undermining was performed.

## 2020-12-14 ENCOUNTER — MYC MEDICAL ADVICE (OUTPATIENT)
Dept: FAMILY MEDICINE | Facility: CLINIC | Age: 25
End: 2020-12-14

## 2021-09-19 ENCOUNTER — HEALTH MAINTENANCE LETTER (OUTPATIENT)
Age: 26
End: 2021-09-19

## 2021-09-23 ENCOUNTER — PATIENT OUTREACH (OUTPATIENT)
Dept: FAMILY MEDICINE | Facility: CLINIC | Age: 26
End: 2021-09-23
Payer: COMMERCIAL

## 2021-09-23 NOTE — LETTER
September 23, 2021      Mayi Aleman  [address]  [place] [address]        Dear ,    This letter is to remind you that you are due for your follow-up Pap smear and Human Papillomavirus (HPV) test.    Please call [phone] to schedule your appointment at your earliest convenience.    If you have completed the appointment outside of the [facility] system, please have the records forwarded to our office. We will update your chart for your provider to review before your next annual wellness visit.     Thank you for choosing [facility]!      Sincerely,    Your [facility] Care Team

## 2021-11-14 ENCOUNTER — HEALTH MAINTENANCE LETTER (OUTPATIENT)
Age: 26
End: 2021-11-14

## 2021-11-19 NOTE — TELEPHONE ENCOUNTER
Hi Dr. Benson,   Patient is lost to pap tracking follow-up. Attempts to contact pt have been made per reminder process and there has been no reply and/or no appt scheduled.       Pap Hx:  07/25/19: ASCUS Pap, + HR HPV (not 16 or 18) result. Plan [place].   08/21/19: [place] Normal appearing cervix, no bx's taken. Plan cotest in 1 year.   10/09/20: ASCUS Pap, + HR HPV (not 16 or 18). Plan cotest in 1 year, due 10/09/21. Results and recommendations released to the pt thru [employer]. Pt didn't view. Tc to mail a result letter.   09/23/21 Reminder Mychart, Letter  10/21/21 Reminder call-lm  11/19/21 Lost to follow-up for pap tracking, fyi routed to provider

## 2022-06-23 NOTE — PROGRESS NOTES
[facility] Dermatology Note  Encounter Date: Jul 12, 2022  Office Visit     Dermatology Problem List:  NUB - left upper paraspinal back bx 7/12/22  1. 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 History of dysplastic nevi  - Compound nevus with atypical features, right groin s/p bx 6/15/17, pending excision   -Compound nevus with moderate dysplastic on the left buttock 6/2017  -Compound dysplastic nevus with moderate to severe atypia, right posterior shoulder, s/p excision 10/6/2016  -Compound dysplastic nevus with moderate to severe atypia, right chest, s/p excision 8/11/2015  3. Hypertrophic scar , right upper back, right chest  -s/p PDL and Core laser treatment   4. Keratosis Pilaris - upper arms   - recommend hydrocortisone or AmLactin   ____________________________________________    Assessment & Plan:    # Consistent with pigmented spindle cell nevus, right upper back - s/p excision 8/11/2015. No clinical evidence of recurrence.  - Recommend sunscreens SPF #30 or greater, protective clothing and avoidance of tanning beds.    # History of DN. No clinical evidence of recurrence. Counseled patient on mole mapping due to numerous nevus.   - Recommend sunscreens SPF #30 or greater, protective clothing and avoidance of tanning beds.    # Hypertrophic scar s/p See PDL and CORE laser treatment.   - See ILK procedure note.      # Neoplasm of uncertain behavior on the left upper paraspinal back. The differential diagnosis includes DN vs other. .  - See procedure note.     # Compound nevus with atypical features, right groin s/p bx 6/15/17 - re pigmentation. Lesion in biopsy reports could be atypical due to location. Patient declines excision. Understands the risks.   - photograph today, recheck in 4 months, biopsy for any changes or if patient changes mid      Procedures Performed:   - Shave biopsy procedure note, location(s): left upper paraspinal back.  After discussion of benefits and risks including but not limited to bleeding, infection, scar, incomplete removal, recurrence, and non-diagnostic biopsy, written consent and photographs were obtained. The area was cleaned with isopropyl alcohol. 0.5mL of 1% lidocaine with epinephrine was injected to obtain adequate anesthesia of lesion(s). Shave biopsy at site(s) performed. Hemostasis was achieved with aluminium chloride. Petrolatum ointment and a sterile dressing were applied. The patient tolerated the procedure and no complications were noted. The patient was provided with verbal and written post care instructions.     Follow-up: 1 year skin check. Pending path. Pending excision of right groin.  Referral to Dr. Jesse Sesay    Staff and Scribe:     Scribe Disclosure:   I, Mayito Vivas, am serving as a scribe to document services personally performed by this physician, Dr. Carin Melendez, based on data collection and the provider's statements to me.     Provider Disclosure:   The documentation recorded by the scribe accurately reflects the services I personally performed and the decisions made by me.    Carin Melendez MD    Department of Dermatology  [facility]: Phone: [phone], Fax:[phone]  [facility]: Phone: [phone], Fax: [phone]      ____________________________________________    CC: Skin Check (No areas of concern hx pigmented spindle cell nevus and DN)    HPI:  Ms. Mayi Aleman is a(n) 27 year old female who presents today as a return patient for a skin check.     Last seen on 6/26/19 for a spot check. At that time, ILK was applied to hypertrophic scar.     Today, patient notes no concern.     Patient is otherwise feeling well, without additional skin concerns.    Labs Reviewed:  N/A    Physical Exam:  Vitals: There were no vitals taken for this visit.  SKIN:  Total skin excluding the undergarment areas was performed. The exam included the head/face, neck, both arms, chest, back, abdomen, both legs, digits and/or nails including mons pubis spot check of last 2 scars(declines further exam of nevi on that site) and external buttocks. Munira Estevez on sites today.   - Well healed scar on previous sites of DN.  - Left upper paraspinal back: 7 mm irregular pigmented macule  - Nails are painted.   - No other lesions of concern on areas examined.     Medications:  Current Outpatient Medications   Medication     albuterol (PROAIR HFA) 108 (90 Base) MCG/ACT inhaler     albuterol (PROAIR HFA/PROVENTIL HFA/VENTOLIN HFA) 108 (90 Base) MCG/ACT inhaler     levonorgestrel-ethinyl estradiol (SEASONALE) 0.15-0.03 MG tablet     No current facility-administered medications for this visit.      Past Medical History:   Patient Active Problem List   Diagnosis     Contraception     Exercise-induced asthma     Intermittent asthma     Family history of malignant neoplasm of breast     ASCUS with positive high risk HPV cervical     Past Medical History:   Diagnosis Date     Abnormal Pap smear of cervix 07/25/2019 07/25/19, 10/09/20     Cervical high risk HPV (human papillomavirus) test positive 07/25/2019 07/25/19, 10/09/20     Exercise-induced asthma 03/04/2011        CC Referred Self, MD  No address on file on close of this encounter.

## 2022-07-12 ENCOUNTER — OFFICE VISIT (OUTPATIENT)
Dept: DERMATOLOGY | Facility: CLINIC | Age: 27
End: 2022-07-12
Payer: COMMERCIAL

## 2022-07-12 DIAGNOSIS — D48.5 NEOPLASM OF UNCERTAIN BEHAVIOR OF SKIN: ICD-10-CM

## 2022-07-12 DIAGNOSIS — D49.2 NEOPLASM OF UNSPECIFIED BEHAVIOR OF BONE, SOFT TISSUE, AND SKIN: ICD-10-CM

## 2022-07-12 DIAGNOSIS — Z86.018 HISTORY OF DYSPLASTIC NEVUS: Primary | ICD-10-CM

## 2022-07-12 PROCEDURE — 88305 TISSUE EXAM BY PATHOLOGIST: CPT | Performed by: DERMATOLOGY

## 2022-07-12 PROCEDURE — 11102 TANGNTL BX SKIN SINGLE LES: CPT | Performed by: DERMATOLOGY

## 2022-07-12 PROCEDURE — 99213 OFFICE O/P EST LOW 20 MIN: CPT | Mod: 25 | Performed by: DERMATOLOGY

## 2022-07-12 PROCEDURE — 88342 IMHCHEM/IMCYTCHM 1ST ANTB: CPT | Performed by: DERMATOLOGY

## 2022-07-12 ASSESSMENT — PAIN SCALES - GENERAL: PAINLEVEL: NO PAIN (0)

## 2022-07-12 NOTE — LETTER
7/12/2022         RE: Mayi Macias  [address]  [facility] [address]        Dear Colleague,    Thank you for referring your patient, Mayi Macias, to the [facility]. Please see a copy of my visit note below.    [facility] Dermatology Note  Encounter Date: Jul 12, 2022  Office Visit     Dermatology Problem List:  NUB - left upper paraspinal back bx 7/12/22  1. 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 History of dysplastic nevi  - Compound nevus with atypical features, right groin s/p bx 6/15/17, pending excision   -Compound nevus with moderate dysplastic on the left buttock 6/2017  -Compound dysplastic nevus with moderate to severe atypia, right posterior shoulder, s/p excision 10/6/2016  -Compound dysplastic nevus with moderate to severe atypia, right chest, s/p excision 8/11/2015  3. Hypertrophic scar , right upper back, right chest  -s/p PDL and Core laser treatment   4. Keratosis Pilaris - upper arms   - recommend hydrocortisone or AmLactin   ____________________________________________    Assessment & Plan:    # Consistent with pigmented spindle cell nevus, right upper back - s/p excision 8/11/2015. No clinical evidence of recurrence.  - Recommend sunscreens SPF #30 or greater, protective clothing and avoidance of tanning beds.    # History of DN. No clinical evidence of recurrence. Counseled patient on mole mapping due to numerous nevus.   - Recommend sunscreens SPF #30 or greater, protective clothing and avoidance of tanning beds.    # Hypertrophic scar s/p See PDL and CORE laser treatment.   - See ILK procedure note.      # Neoplasm of uncertain behavior on the left upper paraspinal back. The differential diagnosis includes DN vs other. .  - See procedure note.     # Compound nevus with atypical features, right groin s/p bx 6/15/17 - re pigmentation. Lesion in biopsy reports could be atypical due to  location. Patient declines excision. Understands the risks.   - photograph today, recheck in 4 months, biopsy for any changes or if patient changes mid      Procedures Performed:   - Shave biopsy procedure note, location(s): left upper paraspinal back. After discussion of benefits and risks including but not limited to bleeding, infection, scar, incomplete removal, recurrence, and non-diagnostic biopsy, written consent and photographs were obtained. The area was cleaned with isopropyl alcohol. 0.5mL of 1% lidocaine with epinephrine was injected to obtain adequate anesthesia of lesion(s). Shave biopsy at site(s) performed. Hemostasis was achieved with aluminium chloride. Petrolatum ointment and a sterile dressing were applied. The patient tolerated the procedure and no complications were noted. The patient was provided with verbal and written post care instructions.     Follow-up: 1 year skin check. Pending path. Pending excision of right groin.  Referral to Dr. Jesse Sesay    Staff and Scribe:     Scribe Disclosure:   Mayito MANCUSO, am serving as a scribe to document services personally performed by this physician, Dr. Carin Melendez, based on data collection and the provider's statements to me.     Provider Disclosure:   The documentation recorded by the scribe accurately reflects the services I personally performed and the decisions made by me.    Carin Melendez MD    Department of Dermatology  [facility]: Phone: [phone], Fax:[phone]  [facility]: Phone: [phone], Fax: [phone]      ____________________________________________    CC: Skin Check (No areas of concern hx pigmented spindle cell nevus and DN)    HPI:  Ms. Mayi Aleman is a(n) 27 year old female who presents today as a return patient for a skin check.     Last seen on 6/26/19 for a spot check. At that time,  ILK was applied to hypertrophic scar.     Today, patient notes no concern.     Patient is otherwise feeling well, without additional skin concerns.    Labs Reviewed:  N/A    Physical Exam:  Vitals: There were no vitals taken for this visit.  SKIN: Total skin excluding the undergarment areas was performed. The exam included the head/face, neck, both arms, chest, back, abdomen, both legs, digits and/or nails including mons pubis spot check of last 2 scars(declines further exam of nevi on that site) and external buttocks. Munira Geovanyw on sites today.   - Well healed scar on previous sites of DN.  - Left upper paraspinal back: 7 mm irregular pigmented macule  - Nails are painted.   - No other lesions of concern on areas examined.     Medications:  Current Outpatient Medications   Medication     albuterol (PROAIR HFA) 108 (90 Base) MCG/ACT inhaler     albuterol (PROAIR HFA/PROVENTIL HFA/VENTOLIN HFA) 108 (90 Base) MCG/ACT inhaler     levonorgestrel-ethinyl estradiol (SEASONALE) 0.15-0.03 MG tablet     No current facility-administered medications for this visit.      Past Medical History:   Patient Active Problem List   Diagnosis     Contraception     Exercise-induced asthma     Intermittent asthma     Family history of malignant neoplasm of breast     ASCUS with positive high risk HPV cervical     Past Medical History:   Diagnosis Date     Abnormal Pap smear of cervix 07/25/2019 07/25/19, 10/09/20     Cervical high risk HPV (human papillomavirus) test positive 07/25/2019 07/25/19, 10/09/20     Exercise-induced asthma 03/04/2011        CC Referred Self, MD  No address on file on close of this encounter.      Again, thank you for allowing me to participate in the care of your patient.        Sincerely,        Carin Melendez MD

## 2022-07-12 NOTE — PATIENT INSTRUCTIONS
Referral to Dr. Jesse Sesay [facility] in [place].     Wound Care After a Biopsy    What is a skin biopsy?  A skin biopsy allows the doctor to examine a very small piece of tissue under the microscope to determine the diagnosis and the best treatment for the skin condition. A local anesthetic (numbing medicine)  is injected with a very small needle into the skin area to be tested. A small piece of skin is taken from the area. Sometimes a suture (stitch) is used.     What are the risks of a skin biopsy?  I will experience scar, bleeding, swelling, pain, crusting and redness. I may experience incomplete removal or recurrence. Risks of this procedure are excessive bleeding, bruising, infection, nerve damage, numbness, thick (hypertrophic or keloidal) scar and non-diagnostic biopsy.    How should I care for my wound for the first 24 hours?  Keep the wound dry and covered for 24 hours  If it bleeds, hold direct pressure on the area for 15 minutes. If bleeding does not stop then go to the emergency room  Avoid strenuous exercise the first 1-2 days or as your doctor instructs you    How should I care for the wound after 24 hours?  After 24 hours, remove the bandage  You may bathe or shower as normal  If you had a scalp biopsy, you can shampoo as usual and can use shower water to clean the biopsy site daily  Clean the wound twice a day with gentle soap and water  Do not scrub, be gentle  Apply white petroleum/Vaseline after cleaning the wound with a cotton swab or a clean finger, and keep the site covered with a Bandaid /bandage. Bandages are not necessary with a scalp biopsy  If you are unable to cover the site with a Bandaid /bandage, re-apply ointment 2-3 times a day to keep the site moist. Moisture will help with healing  Avoid strenuous activity for first 1-2 days  Avoid lakes, rivers, pools, and oceans until the stitches are removed or the site is healed    How do I clean my wound?  Wash hands thoroughly  with soap or use hand  before all wound care  Clean the wound with gentle soap and water  Apply white petroleum/Vaseline  to wound after it is clean  Replace the Bandaid /bandage to keep the wound covered for the first few days or as instructed by your doctor  If you had a scalp biopsy, warm shower water to the area on a daily basis should suffice    What should I use to clean my wound?   Cotton-tipped applicators (Qtips )  White petroleum jelly (Vaseline ). Use a clean new container and use Q-tips to apply.  Bandaids   as needed  Gentle soap     How should I care for my wound long term?  Do not get your wound dirty  Keep up with wound care for one week or until the area is healed.  A small scab will form and fall off by itself when the area is completely healed. The area will be red and will become pink in color as it heals. Sun protection is very important for how your scar will turn out. Sunscreen with an SPF 30 or greater is recommended once the area is healed.  You should have some soreness but it should be mild and slowly go away over several days. Talk to your doctor about using tylenol for pain,    When should I call my doctor?  If you have increased:   Pain or swelling  Pus or drainage (clear or slightly yellow drainage is ok)  Temperature over 100F  Spreading redness or warmth around wound    When will I hear about my results?  The biopsy results can take 2 weeks to come back.  Your results will automatically release to [employer] before your provider has even reviewed them.  The clinic will call you with the results, send you a [employer] message, or have you schedule a follow-up clinic or phone time to discuss the results.  Contact our clinics if you do not hear from us in 2 weeks.    Who should I call with questions?  [facility]: [phone]  [facility]: [phone]  For urgent needs outside of business hours call the [facility]  at [phone] and ask for the dermatology resident on call

## 2022-07-12 NOTE — NURSING NOTE
Mayi Aleman's goals for this visit include:   Chief Complaint   Patient presents with     Skin Check     No areas of concern hx pigmented spindle cell nevus and DN       She requests these members of her care team be copied on today's visit information:     PCP: Yisel Benson    Referring Provider:  Referred Self, MD  No address on file    There were no vitals taken for this visit.    Do you need any medication refills at today's visit? Analia Miller LPN

## 2022-07-18 LAB
PATH REPORT.COMMENTS IMP SPEC: NORMAL
PATH REPORT.COMMENTS IMP SPEC: NORMAL
PATH REPORT.FINAL DX SPEC: NORMAL
PATH REPORT.GROSS SPEC: NORMAL
PATH REPORT.MICROSCOPIC SPEC OTHER STN: NORMAL
PATH REPORT.RELEVANT HX SPEC: NORMAL

## 2022-10-14 ENCOUNTER — OFFICE VISIT (OUTPATIENT)
Dept: DERMATOLOGY | Facility: CLINIC | Age: 27
End: 2022-10-14
Payer: COMMERCIAL

## 2022-10-14 DIAGNOSIS — L91.0 HYPERTROPHIC SCAR: Primary | ICD-10-CM

## 2022-10-14 DIAGNOSIS — D22.9 ATYPICAL NEVUS: ICD-10-CM

## 2022-10-14 PROCEDURE — 99213 OFFICE O/P EST LOW 20 MIN: CPT | Performed by: DERMATOLOGY

## 2022-10-14 ASSESSMENT — PAIN SCALES - GENERAL: PAINLEVEL: NO PAIN (0)

## 2022-10-14 NOTE — NURSING NOTE
Mayi Macias's chief complaint for this visit includes:  Chief Complaint   Patient presents with     RECHECK     Spot in right groin. Hx of DN.      PCP: Yisel Benson    Referring Provider:  No referring provider defined for this encounter.    There were no vitals taken for this visit.  No Pain (0)        Allergies   Allergen Reactions     Sulfate          Do you need any medication refills at today's visit? No    Gloria Wiggins LPN

## 2022-10-14 NOTE — LETTER
"    10/14/2022         RE: Mayi Macias  [address]  [facility] [address]        Dear Colleague,    Thank you for referring your patient, Mayi Macias, to the [facility]. Please see a copy of my visit note below.    [facility] Dermatology Note  Encounter Date: Oct 14, 2022  Office Visit     Dermatology Problem List:  1. 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 History of dysplastic nevi  - Compound dysplastic nevus with mild atypia - left upper paraspinal back, s/p bx 7/12/22  - Compound nevus with atypical features, right groin s/p bx 6/15/17, pending excision   - Compound nevus with moderate dysplastic on the left buttock 6/2017  - Compound dysplastic nevus with moderate to severe atypia, right posterior shoulder, s/p excision 10/6/2016  - Compound dysplastic nevus with moderate to severe atypia, right chest, s/p excision 8/11/2015  3. Hypertrophic scar , right upper back, right chest  - s/p PDL and Core laser treatment   4. Keratosis Pilaris - upper arms   - recommend hydrocortisone or AmLactin   ____________________________________________     Assessment & Plan:     # Consistent with pigmented spindle cell nevus, right upper back - s/p excision 8/11/2015. clear today        # History of DN. -rechecked today, no repigmentation     # Hypertrophic scar s/p See PDL and CORE laser treatment and also     # 1.3 cm compound nevus with atypical features, right groin s/p bx 6/15/17 - re-pigmentation. \"Lesion in biopsy reports could be atypical due to location. Patient declines excision. Understands the risks.\" Appears unchanged on exam today. Pt will report changes  - Monitor for changes, recheck on follow up.      Procedures Performed:   None.    Follow-up: July 2023 for a skin check.    Staff and Scribe:     Scribe Disclosure:   I, Mynor Antonio, am serving as a scribe to document services personally performed " by this physician, Dr. Carin Melendez, based on data collection and the provider's statements to me.     Provider Disclosure:   The documentation recorded by the scribe accurately reflects the services I personally performed and the decisions made by me.    Carin Melendez MD    Department of Dermatology  [facility]: Phone: [phone], Fax:[phone]  [facility]: Phone: [phone], Fax: [phone]      ____________________________________________    CC: RECHECK (Spot in right groin. Hx of DN. )    HPI:  Ms. Mayi Macias is a(n) 27 year old female who presents today as a return patient for a spot check.    Last seen 7/12/22 for a skin check. At that time, a biopsy of the left upper paraspinal back was obtained which returned as a compound dysplastic nevus with mild atypia.    Today, she notes a stop in the right groin.     Patient is otherwise feeling well, without additional skin concerns.    Labs Reviewed:  N/A    Physical Exam:  Vitals: There were no vitals taken for this visit.  SKIN: Focused examination of back, groin was performed.  - Well healed scars at sites of previous DN, no repigmentation or nodularity noted.   - 1.3 cm pigmented patch within scar, no change from prior photo  - No other lesions of concern on areas examined.     Medications:  Current Outpatient Medications   Medication     albuterol (PROAIR HFA) 108 (90 Base) MCG/ACT inhaler     albuterol (PROAIR HFA/PROVENTIL HFA/VENTOLIN HFA) 108 (90 Base) MCG/ACT inhaler     levonorgestrel-ethinyl estradiol (SEASONALE) 0.15-0.03 MG tablet     No current facility-administered medications for this visit.      Past Medical History:   Patient Active Problem List   Diagnosis     Contraception     Exercise-induced asthma     Intermittent asthma     Family history of malignant neoplasm of breast     ASCUS with positive high risk HPV  cervical     History of dysplastic nevus     Past Medical History:   Diagnosis Date     Abnormal Pap smear of cervix 07/25/2019 07/25/19, 10/09/20     Cervical high risk HPV (human papillomavirus) test positive 07/25/2019 07/25/19, 10/09/20     Exercise-induced asthma 03/04/2011        CC No referring provider defined for this encounter. on close of this encounter.       Again, thank you for allowing me to participate in the care of your patient.        Sincerely,        Carin Melendez MD

## 2022-10-14 NOTE — PROGRESS NOTES
"[facility] Dermatology Note  Encounter Date: Oct 14, 2022  Office Visit     Dermatology Problem List:  1. 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 History of dysplastic nevi  - Compound dysplastic nevus with mild atypia - left upper paraspinal back, s/p bx 7/12/22  - Compound nevus with atypical features, right groin s/p bx 6/15/17, pending excision   - Compound nevus with moderate dysplastic on the left buttock 6/2017  - Compound dysplastic nevus with moderate to severe atypia, right posterior shoulder, s/p excision 10/6/2016  - Compound dysplastic nevus with moderate to severe atypia, right chest, s/p excision 8/11/2015  3. Hypertrophic scar , right upper back, right chest  - s/p PDL and Core laser treatment   4. Keratosis Pilaris - upper arms   - recommend hydrocortisone or AmLactin   ____________________________________________     Assessment & Plan:     # Consistent with pigmented spindle cell nevus, right upper back - s/p excision 8/11/2015. clear today        # History of DN. -rechecked today, no repigmentation     # Hypertrophic scar s/p See PDL and CORE laser treatment and also     # 1.3 cm compound nevus with atypical features, right groin s/p bx 6/15/17 - re-pigmentation. \"Lesion in biopsy reports could be atypical due to location. Patient declines excision. Understands the risks.\" Appears unchanged on exam today. Pt will report changes  - Monitor for changes, recheck on follow up.      Procedures Performed:   None.    Follow-up: July 2023 for a skin check.    Staff and Scribe:     Scribe Disclosure:   I, Mynor Antonio, am serving as a scribe to document services personally performed by this physician, Dr. Carin Melendez, based on data collection and the provider's statements to me.     Provider Disclosure:   The documentation recorded by the scribe accurately reflects the services I personally performed and the decisions made by " me.    Carin Melendez MD    Department of Dermatology  [facility]: Phone: [phone], Fax:[phone]  [facility]: Phone: [phone], Fax: [phone]      ____________________________________________    CC: RECHECK (Spot in right groin. Hx of DN. )    HPI:  Ms. Mayi Macias is a(n) 27 year old female who presents today as a return patient for a spot check.    Last seen 7/12/22 for a skin check. At that time, a biopsy of the left upper paraspinal back was obtained which returned as a compound dysplastic nevus with mild atypia.    Today, she notes a stop in the right groin.     Patient is otherwise feeling well, without additional skin concerns.    Labs Reviewed:  N/A    Physical Exam:  Vitals: There were no vitals taken for this visit.  SKIN: Focused examination of back, groin was performed.  - Well healed scars at sites of previous DN, no repigmentation or nodularity noted.   - 1.3 cm pigmented patch within scar, no change from prior photo  - No other lesions of concern on areas examined.     Medications:  Current Outpatient Medications   Medication     albuterol (PROAIR HFA) 108 (90 Base) MCG/ACT inhaler     albuterol (PROAIR HFA/PROVENTIL HFA/VENTOLIN HFA) 108 (90 Base) MCG/ACT inhaler     levonorgestrel-ethinyl estradiol (SEASONALE) 0.15-0.03 MG tablet     No current facility-administered medications for this visit.      Past Medical History:   Patient Active Problem List   Diagnosis     Contraception     Exercise-induced asthma     Intermittent asthma     Family history of malignant neoplasm of breast     ASCUS with positive high risk HPV cervical     History of dysplastic nevus     Past Medical History:   Diagnosis Date     Abnormal Pap smear of cervix 07/25/2019 07/25/19, 10/09/20     Cervical high risk HPV (human papillomavirus) test positive 07/25/2019 07/25/19, 10/09/20      Exercise-induced asthma 03/04/2011        CC No referring provider defined for this encounter. on close of this encounter.

## 2022-11-21 ENCOUNTER — HEALTH MAINTENANCE LETTER (OUTPATIENT)
Age: 27
End: 2022-11-21

## 2023-11-25 ENCOUNTER — HEALTH MAINTENANCE LETTER (OUTPATIENT)
Age: 28
End: 2023-11-25

## 2024-03-14 ENCOUNTER — OFFICE VISIT (OUTPATIENT)
Dept: DERMATOLOGY | Facility: CLINIC | Age: 29
End: 2024-03-14
Payer: COMMERCIAL

## 2024-03-14 DIAGNOSIS — D49.2 NEOPLASM OF UNSPECIFIED BEHAVIOR OF BONE, SOFT TISSUE, AND SKIN: ICD-10-CM

## 2024-03-14 DIAGNOSIS — Z86.018 HISTORY OF DYSPLASTIC NEVUS: Primary | ICD-10-CM

## 2024-03-14 PROCEDURE — 99213 OFFICE O/P EST LOW 20 MIN: CPT | Performed by: DERMATOLOGY

## 2024-03-14 NOTE — NURSING NOTE
Mayi Macias's goals for this visit include:   Chief Complaint   Patient presents with    Skin Check     Patient is here for a full body skin check, areas of concern none, Family HX of skin cancer       She requests these members of her care team be copied on today's visit information:     PCP: Yisel Benson    Referring Provider:  Referred Self, MD  No address on file    There were no vitals taken for this visit.    Do you need any medication refills at today's visit?         Mariia Wong EMT

## 2024-03-14 NOTE — LETTER
"    3/14/2024         RE: Mayi Macias  [address]  [facility] [address]        Dear Colleague,    Thank you for referring your patient, Mayi Macias, to the [facility]. Please see a copy of my visit note below.    [facility] Dermatology Note  Encounter Date: Mar 14, 2024  Office Visit     Dermatology Problem List:  Last skin check 03/14/24, recommended yearly  1. 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 History of dysplastic nevi  - Compound dysplastic nevus with mild atypia - left upper paraspinal back, s/p bx 7/12/22  - Compound nevus with atypical features, right groin s/p bx 6/15/17,  - Compound nevus with moderate dysplastic on the left buttock 6/2017  - Compound dysplastic nevus with moderate to severe atypia, right posterior shoulder, s/p excision 10/6/2016  - Compound dysplastic nevus with moderate to severe atypia, right chest, s/p excision 8/11/2015  3. Hypertrophic scar , right upper back, right chest  - s/p PDL and Core laser treatment   4. Keratosis Pilaris - upper arms   - recommend hydrocortisone or AmLactin     ____________________________________________    Assessment & Plan:    # Lesion to monitor on right medial thigh. Consistent with growing nevus(distal globules), 1mm, nevus  - Will recheck at next visit. Can be monitored as symmetric and well demarcated.     # Compound nevus with moderate dysplastic on the left buttock. S/p bx 6/2017  - no repigmentation    #left buttock lateral, NUB- DN or other, recommended biopsy to rule out skin cancer, pt will schedule due to travel    # Consistent with pigmented spindle cell nevus, right upper back - s/p excision 8/11/2015.   - no repigmentation     # 1.3 cm compound nevus with atypical features, right groin s/p bx 6/15/17 - re-pigmentation. \"Lesion in biopsy reports could be atypical due to location. Patient declines excision. Understands the " "risks.\"   -Last photo obtained 07/07/2022. Appears unchanged on exam today.   - Monitor for changes, recheck on follow up in 1 year.     # History of DN.  - ABCDEs: Counseled ABCDEs of melanoma: Asymmetry, Border (irregularity), Color (not uniform, changes in color), Diameter (greater than 6 mm which is about the size of a pencil eraser), and Evolving (any changes in preexisting moles).  - Monitor: Patient to continue monitoring at home and will contact the clinic for any changes.  - Sun protection: Counseled SPF30+ sunscreen, UPF clothing, sun avoidance, tanning bed avoidance.       Procedures Performed:   None.    Follow-up: for biopsy and 1 year(s) in-person, or earlier for new or changing lesions    Staff and Scribe:     Scribe Disclosure:   I, HUMAIRA LORNEZO, am serving as a scribe; to document services personally performed by Carin Melendez MD -based on data collection and the provider's statements to me.    Provider Disclosure:   The documentation recorded by the scribe accurately reflects the services I personally performed and the decisions made by me.    Carin Melendez MD    Department of Dermatology  [facility]: Phone: [phone], Fax:[phone]  [facility]: Phone: [phone], Fax: [phone]   ____________________________________________    CC: Skin Check (Patient is here for a full body skin check, areas of concern none, Family HX of skin cancer)    HPI:  Ms. Mayi Macias is a(n) 29 year old female who presents today as a return patient for FBSE.    Nothing bleeding, crusting, or changing.     Patient is otherwise feeling well, without additional skin concerns.    Labs Reviewed:  N/A    Physical Exam:  Vitals: There were no vitals taken for this visit.  SKIN: Total skin including external buttocks was performed. The exam included the head/face, neck, both arms, chest, " back, abdomen, both legs, digits and/or nails.   - Left lateral buttocks, pigmented lesion.  - Right medial thigh, 1 mm pigmented macule with globules distally.  - There is no erythema, telangectasias, nodularity, or pigmentation on the sites of prior DN.  - No other lesions of concern on areas examined.     Medications:  Current Outpatient Medications   Medication     albuterol (PROAIR HFA) 108 (90 Base) MCG/ACT inhaler     albuterol (PROAIR HFA/PROVENTIL HFA/VENTOLIN HFA) 108 (90 Base) MCG/ACT inhaler     levonorgestrel-ethinyl estradiol (SEASONALE) 0.15-0.03 MG tablet     No current facility-administered medications for this visit.      Past Medical History:   Patient Active Problem List   Diagnosis     Contraception     Exercise-induced asthma     Intermittent asthma     Family history of malignant neoplasm of breast     ASCUS with positive high risk HPV cervical     History of dysplastic nevus     Past Medical History:   Diagnosis Date     Abnormal Pap smear of cervix 07/25/2019 07/25/19, 10/09/20     Cervical high risk HPV (human papillomavirus) test positive 07/25/2019 07/25/19, 10/09/20     Exercise-induced asthma 03/04/2011        CC Referred Self, MD  No address on file on close of this encounter.      Again, thank you for allowing me to participate in the care of your patient.        Sincerely,        Carin Melendez MD

## 2024-03-14 NOTE — PATIENT INSTRUCTIONS

## 2024-03-14 NOTE — PROGRESS NOTES
"[facility] Dermatology Note  Encounter Date: Mar 14, 2024  Office Visit     Dermatology Problem List:  Last skin check 03/14/24, recommended yearly  1. 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 History of dysplastic nevi  - Compound dysplastic nevus with mild atypia - left upper paraspinal back, s/p bx 7/12/22  - Compound nevus with atypical features, right groin s/p bx 6/15/17,  - Compound nevus with moderate dysplastic on the left buttock 6/2017  - Compound dysplastic nevus with moderate to severe atypia, right posterior shoulder, s/p excision 10/6/2016  - Compound dysplastic nevus with moderate to severe atypia, right chest, s/p excision 8/11/2015  3. Hypertrophic scar , right upper back, right chest  - s/p PDL and Core laser treatment   4. Keratosis Pilaris - upper arms   - recommend hydrocortisone or AmLactin     ____________________________________________    Assessment & Plan:    # Lesion to monitor on right medial thigh. Consistent with growing nevus(distal globules), 1mm, nevus  - Will recheck at next visit. Can be monitored as symmetric and well demarcated.     # Compound nevus with moderate dysplastic on the left buttock. S/p bx 6/2017  - no repigmentation    #left buttock lateral, NUB- DN or other, recommended biopsy to rule out skin cancer, pt will schedule due to travel    # Consistent with pigmented spindle cell nevus, right upper back - s/p excision 8/11/2015.   - no repigmentation     # 1.3 cm compound nevus with atypical features, right groin s/p bx 6/15/17 - re-pigmentation. \"Lesion in biopsy reports could be atypical due to location. Patient declines excision. Understands the risks.\"   -Last photo obtained 07/07/2022. Appears unchanged on exam today.   - Monitor for changes, recheck on follow up in 1 year.     # History of DN.  - ABCDEs: Counseled ABCDEs of melanoma: Asymmetry, Border (irregularity), Color (not uniform, changes " in color), Diameter (greater than 6 mm which is about the size of a pencil eraser), and Evolving (any changes in preexisting moles).  - Monitor: Patient to continue monitoring at home and will contact the clinic for any changes.  - Sun protection: Counseled SPF30+ sunscreen, UPF clothing, sun avoidance, tanning bed avoidance.       Procedures Performed:   None.    Follow-up: for biopsy and 1 year(s) in-person, or earlier for new or changing lesions    Staff and Scribe:     Scribe Disclosure:   I, HUMAIRA LORENZO, am serving as a scribe; to document services personally performed by Carin Melendez MD -based on data collection and the provider's statements to me.    Provider Disclosure:   The documentation recorded by the scribe accurately reflects the services I personally performed and the decisions made by me.    Carin Melendez MD    Department of Dermatology  [facility]: Phone: [phone], Fax:[phone]  [facility]: Phone: [phone], Fax: [phone]   ____________________________________________    CC: Skin Check (Patient is here for a full body skin check, areas of concern none, Family HX of skin cancer)    HPI:  Ms. Mayi Macias is a(n) 29 year old female who presents today as a return patient for FBSE.    Nothing bleeding, crusting, or changing.     Patient is otherwise feeling well, without additional skin concerns.    Labs Reviewed:  N/A    Physical Exam:  Vitals: There were no vitals taken for this visit.  SKIN: Total skin including external buttocks was performed. The exam included the head/face, neck, both arms, chest, back, abdomen, both legs, digits and/or nails.   - Left lateral buttocks, pigmented lesion.  - Right medial thigh, 1 mm pigmented macule with globules distally.  - There is no erythema, telangectasias, nodularity, or pigmentation on the sites of prior DN.  -  No other lesions of concern on areas examined.     Medications:  Current Outpatient Medications   Medication    albuterol (PROAIR HFA) 108 (90 Base) MCG/ACT inhaler    albuterol (PROAIR HFA/PROVENTIL HFA/VENTOLIN HFA) 108 (90 Base) MCG/ACT inhaler    levonorgestrel-ethinyl estradiol (SEASONALE) 0.15-0.03 MG tablet     No current facility-administered medications for this visit.      Past Medical History:   Patient Active Problem List   Diagnosis    Contraception    Exercise-induced asthma    Intermittent asthma    Family history of malignant neoplasm of breast    ASCUS with positive high risk HPV cervical    History of dysplastic nevus     Past Medical History:   Diagnosis Date    Abnormal Pap smear of cervix 07/25/2019 07/25/19, 10/09/20    Cervical high risk HPV (human papillomavirus) test positive 07/25/2019 07/25/19, 10/09/20    Exercise-induced asthma 03/04/2011        CC Referred Self, MD  No address on file on close of this encounter.

## 2024-04-03 NOTE — PROGRESS NOTES
[facility] Dermatology Note  Encounter Date: Apr 4, 2024  Office Visit     Dermatology Problem List:  Last skin check 03/14/24, recommended yearly  0. NUB, L lateral buttock, s/p bx 4/4/24  0. NUB, R tricep, s/p bx 4/4/24  1. 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 History of dysplastic nevi  - Compound dysplastic nevus with mild atypia - left upper paraspinal back, s/p bx 7/12/22  - Compound nevus with atypical features, right groin s/p bx 6/15/17,  - Compound nevus with moderate dysplastic on the left buttock 6/2017  - Compound dysplastic nevus with moderate to severe atypia, right posterior shoulder, s/p excision 10/6/2016  - Compound dysplastic nevus with moderate to severe atypia, right chest, s/p excision 8/11/2015  3. Hypertrophic scar , right upper back, right chest  - s/p PDL and Core laser treatment   4. Keratosis Pilaris - upper arms   - recommend hydrocortisone or AmLactin     ____________________________________________    Assessment & Plan:    # History of DN.  - ABCDEs: Counseled ABCDEs of melanoma: Asymmetry, Border (irregularity), Color (not uniform, changes in color), Diameter (greater than 6 mm which is about the size of a pencil eraser), and Evolving (any changes in preexisting moles).  - Monitor: Patient to continue monitoring at home and will contact the clinic for any changes.  - Sun protection: Counseled SPF30+ sunscreen, UPF clothing, sun avoidance, tanning bed avoidance.    # Neoplasm of unspecified behavior of the skin (D49.2) on the L lateral buttock. The differential diagnosis includes DN or other.   - Shave biopsy today. See procedure section.    # Neoplasm of unspecified behavior of the skin (D49.2) on the R tricep. The differential diagnosis includes DN or other. .   - 2 mm pigmented lesion  - Shave biopsy today. See procedure section.    # Lesion to monitor on right medial thigh. Consistent with growing  nevus(distal globules), 1mm, nevus-resolved.         Procedures Performed:   - Shave biopsy procedure note, location(s): L lateral buttock, R tricep. After discussion of benefits and risks including but not limited to bleeding, infection, scar, incomplete removal, recurrence, and non-diagnostic biopsy, written consent and photographs were obtained. The area was cleaned with isopropyl alcohol. 0.5mL of 1% lidocaine with epinephrine was injected to obtain adequate anesthesia of lesion(s). Shave biopsy at site(s) performed. Hemostasis was achieved with aluminium chloride. Petrolatum ointment and a sterile dressing were applied. The patient tolerated the procedure and no complications were noted. The patient was provided with verbal and written post care instructions.       Follow-up: 1 year(s) in-person, or earlier for new or changing lesions    Staff and Scribe:     Scribe Disclosure:   I, Wendy Bazzi, am serving as a scribe to document services personally performed by Carin Melendez MD based on data collection and the provider's statements to me.     Provider Disclosure:   The documentation recorded by the scribe accurately reflects the services I personally performed and the decisions made by me.    Carin Melendez MD    Department of Dermatology  [facility]: Phone: [phone], Fax:[phone]  [facility]: Phone: [phone], Fax: [phone]   ____________________________________________    CC: Skin Check (2 moles that need biopsies left buttock and right arm)    HPI:  Ms. Mayi Macias is a(n) 29 year old female who presents today as a return patient for biopsy.    Today, patient reports she has returned       Patient is otherwise feeling well, without additional skin concerns.    Labs Reviewed:  N/A    Physical Exam:  Vitals: There were no vitals taken for this visit.  SKIN: Focused  examination of buttock, R thigh and R upper arm was performed.  - Right medial thigh is normal.   - There is a 8mm pigmented lesion located on the L lateral buttock .   - There is a 2 mm pigmented lesion on the R tricep. irregular  - No other lesions of concern on areas examined.     Medications:  Current Outpatient Medications   Medication Sig Dispense Refill    albuterol (PROAIR HFA) 108 (90 Base) MCG/ACT inhaler Inhale 2 puffs into the lungs every 6 hours 1 Inhaler 11    albuterol (PROAIR HFA/PROVENTIL HFA/VENTOLIN HFA) 108 (90 Base) MCG/ACT inhaler Inhale 2 puffs into the lungs every 6 hours 1 Inhaler 11    levonorgestrel-ethinyl estradiol (SEASONALE) 0.15-0.03 MG tablet Take 1 tablet by mouth daily 61 tablet 5     No current facility-administered medications for this visit.      Past Medical History:   Patient Active Problem List   Diagnosis    Contraception    Exercise-induced asthma    Intermittent asthma    Family history of malignant neoplasm of breast    ASCUS with positive high risk HPV cervical    History of dysplastic nevus     Past Medical History:   Diagnosis Date    Abnormal Pap smear of cervix 07/25/2019 07/25/19, 10/09/20    Cervical high risk HPV (human papillomavirus) test positive 07/25/2019 07/25/19, 10/09/20    Exercise-induced asthma 03/04/2011        CC No referring provider defined for this encounter. on close of this encounter.

## 2024-04-04 ENCOUNTER — OFFICE VISIT (OUTPATIENT)
Dept: DERMATOLOGY | Facility: CLINIC | Age: 29
End: 2024-04-04
Payer: COMMERCIAL

## 2024-04-04 DIAGNOSIS — D49.2 NEOPLASM OF UNSPECIFIED BEHAVIOR OF BONE, SOFT TISSUE, AND SKIN: Primary | ICD-10-CM

## 2024-04-04 PROCEDURE — 88342 IMHCHEM/IMCYTCHM 1ST ANTB: CPT | Performed by: PATHOLOGY

## 2024-04-04 PROCEDURE — 99213 OFFICE O/P EST LOW 20 MIN: CPT | Mod: 25 | Performed by: DERMATOLOGY

## 2024-04-04 PROCEDURE — 88341 IMHCHEM/IMCYTCHM EA ADD ANTB: CPT | Performed by: PATHOLOGY

## 2024-04-04 PROCEDURE — 11102 TANGNTL BX SKIN SINGLE LES: CPT | Performed by: DERMATOLOGY

## 2024-04-04 PROCEDURE — 11103 TANGNTL BX SKIN EA SEP/ADDL: CPT | Performed by: DERMATOLOGY

## 2024-04-04 PROCEDURE — 88305 TISSUE EXAM BY PATHOLOGIST: CPT | Performed by: PATHOLOGY

## 2024-04-04 ASSESSMENT — PAIN SCALES - GENERAL: PAINLEVEL: NO PAIN (0)

## 2024-04-04 NOTE — LETTER
4/4/2024         RE: Mayi Macias  [address]  [facility] [address]        Dear Colleague,    Thank you for referring your patient, Mayi Macias, to the [facility]. Please see a copy of my visit note below.      [facility] Dermatology Note  Encounter Date: Apr 4, 2024  Office Visit     Dermatology Problem List:  Last skin check 03/14/24, recommended yearly  0. NUB, L lateral buttock, s/p bx 4/4/24  0. NUB, R tricep, s/p bx 4/4/24  1. 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 History of dysplastic nevi  - Compound dysplastic nevus with mild atypia - left upper paraspinal back, s/p bx 7/12/22  - Compound nevus with atypical features, right groin s/p bx 6/15/17,  - Compound nevus with moderate dysplastic on the left buttock 6/2017  - Compound dysplastic nevus with moderate to severe atypia, right posterior shoulder, s/p excision 10/6/2016  - Compound dysplastic nevus with moderate to severe atypia, right chest, s/p excision 8/11/2015  3. Hypertrophic scar , right upper back, right chest  - s/p PDL and Core laser treatment   4. Keratosis Pilaris - upper arms   - recommend hydrocortisone or AmLactin     ____________________________________________    Assessment & Plan:    # History of DN.  - ABCDEs: Counseled ABCDEs of melanoma: Asymmetry, Border (irregularity), Color (not uniform, changes in color), Diameter (greater than 6 mm which is about the size of a pencil eraser), and Evolving (any changes in preexisting moles).  - Monitor: Patient to continue monitoring at home and will contact the clinic for any changes.  - Sun protection: Counseled SPF30+ sunscreen, UPF clothing, sun avoidance, tanning bed avoidance.    # Neoplasm of unspecified behavior of the skin (D49.2) on the L lateral buttock. The differential diagnosis includes DN or other.   - Shave biopsy today. See procedure section.    # Neoplasm of  unspecified behavior of the skin (D49.2) on the R tricep. The differential diagnosis includes DN or other. .   - 2 mm pigmented lesion  - Shave biopsy today. See procedure section.    # Lesion to monitor on right medial thigh. Consistent with growing nevus(distal globules), 1mm, nevus-resolved.         Procedures Performed:   - Shave biopsy procedure note, location(s): L lateral buttock, R tricep. After discussion of benefits and risks including but not limited to bleeding, infection, scar, incomplete removal, recurrence, and non-diagnostic biopsy, written consent and photographs were obtained. The area was cleaned with isopropyl alcohol. 0.5mL of 1% lidocaine with epinephrine was injected to obtain adequate anesthesia of lesion(s). Shave biopsy at site(s) performed. Hemostasis was achieved with aluminium chloride. Petrolatum ointment and a sterile dressing were applied. The patient tolerated the procedure and no complications were noted. The patient was provided with verbal and written post care instructions.       Follow-up: 1 year(s) in-person, or earlier for new or changing lesions    Staff and Scribe:     Scribe Disclosure:   I, Wendy Bazzi, am serving as a scribe to document services personally performed by Carin Melendez MD based on data collection and the provider's statements to me.     Provider Disclosure:   The documentation recorded by the scribe accurately reflects the services I personally performed and the decisions made by me.    Carin Melendez MD    Department of Dermatology  [facility]: Phone: [phone], Fax:[phone]  [facility]: Phone: [phone], Fax: [phone]   ____________________________________________    CC: Skin Check (2 moles that need biopsies left buttock and right arm)    HPI:  Ms. Mayi Macias is a(n) 29 year old female who presents today as a return  patient for biopsy.    Today, patient reports she has returned       Patient is otherwise feeling well, without additional skin concerns.    Labs Reviewed:  N/A    Physical Exam:  Vitals: There were no vitals taken for this visit.  SKIN: Focused examination of buttock, R thigh and R upper arm was performed.  - Right medial thigh is normal.   - There is a 8mm pigmented lesion located on the L lateral buttock .   - There is a 2 mm pigmented lesion on the R tricep. irregular  - No other lesions of concern on areas examined.     Medications:  Current Outpatient Medications   Medication Sig Dispense Refill     albuterol (PROAIR HFA) 108 (90 Base) MCG/ACT inhaler Inhale 2 puffs into the lungs every 6 hours 1 Inhaler 11     albuterol (PROAIR HFA/PROVENTIL HFA/VENTOLIN HFA) 108 (90 Base) MCG/ACT inhaler Inhale 2 puffs into the lungs every 6 hours 1 Inhaler 11     levonorgestrel-ethinyl estradiol (SEASONALE) 0.15-0.03 MG tablet Take 1 tablet by mouth daily 61 tablet 5     No current facility-administered medications for this visit.      Past Medical History:   Patient Active Problem List   Diagnosis     Contraception     Exercise-induced asthma     Intermittent asthma     Family history of malignant neoplasm of breast     ASCUS with positive high risk HPV cervical     History of dysplastic nevus     Past Medical History:   Diagnosis Date     Abnormal Pap smear of cervix 07/25/2019 07/25/19, 10/09/20     Cervical high risk HPV (human papillomavirus) test positive 07/25/2019 07/25/19, 10/09/20     Exercise-induced asthma 03/04/2011        CC No referring provider defined for this encounter. on close of this encounter.      The following medication was given:     MEDICATION:  Lidocaine with epinephrine 1% 1:[id number]  ROUTE: SQ  SITE: see procedure note  DOSE: 1cc  LOT #: [id number]  : [employer]  EXPIRATION DATE: 03-31-25  NDC#: 48931-569-45  Was there drug waste? no  Multi-dose vial: Yes    Celine Vidal  RN  April 4, 2024     Again, thank you for allowing me to participate in the care of your patient.        Sincerely,        Carin Melendez MD

## 2024-04-04 NOTE — NURSING NOTE
Mayi Macias's chief complaint for this visit includes:  Chief Complaint   Patient presents with    Skin Check     2 moles that need biopsies left buttock and right arm     PCP: Yisel Benson    Referring Provider:  No referring provider defined for this encounter.    There were no vitals taken for this visit.  No Pain (0)        Allergies   Allergen Reactions    Sulfate          Do you need any medication refills at today's visit?    No

## 2024-04-04 NOTE — PATIENT INSTRUCTIONS
Wound Care After a Biopsy    What is a skin biopsy?  A skin biopsy allows the doctor to examine a very small piece of tissue under the microscope to determine the diagnosis and the best treatment for the skin condition. A local anesthetic (numbing medicine) is injected with a very small needle into the skin area to be tested. A small piece of skin is taken from the area. Sometimes a suture (stitch) is used.     What are the risks of a skin biopsy?  I will experience scar, bleeding, swelling, pain, crusting and redness. I may experience incomplete removal or recurrence. Risks of this procedure are excessive bleeding, bruising, infection, nerve damage, numbness, thick (hypertrophic or keloidal) scar and non-diagnostic biopsy.    How should I care for my wound for the first 24 hours?  Keep the wound dry and covered for 24 hours  If it bleeds, hold direct pressure on the area for 15 minutes. If bleeding does not stop, call us or go to the emergency room  Avoid strenuous exercise the first 1-2 days or as your doctor instructs you    How should I care for the wound after 24 hours?  After 24 hours, remove the bandage  You may bathe or shower as normal  If you had a scalp biopsy, you can shampoo as usual and can use shower water to clean the biopsy site daily  Clean the wound once a day with gentle soap and water  Do not scrub, be gentle  Apply white petroleum/Vaseline after cleaning the wound with a cotton swab or a clean finger, and keep the site covered with a Bandaid /bandage. Bandages are not necessary with a scalp biopsy  If you are unable to cover the site with a Bandaid /bandage, re-apply ointment 2-3 times a day to keep the site moist. Moisture will help with healing  Avoid strenuous activity for first 1-2 days  Avoid lakes, rivers, pools, and oceans until the stitches are removed or the site is healed    How do I clean my wound?  Wash hands thoroughly with soap or use hand  before all wound care  Clean  the wound with gentle soap and water  Apply white petroleum/Vaseline  to wound after it is clean  Replace the Bandaid /bandage to keep the wound covered for the first few days or as instructed by your doctor  If you had a scalp biopsy, warm shower water to the area on a daily basis should suffice    What should I use to clean my wound?   Cotton-tipped applicators (Qtips )  White petroleum jelly (Vaseline ). Use a clean new container and use Q-tips to apply.  Bandaids  as needed  Gentle soap     How should I care for my wound long term?  Do not get your wound dirty  Keep up with wound care for one week or until the area is healed.     A small scab will form and fall off by itself when the area is completely healed. The area will be red and will become pink in color as it heals. Sun protection is very important for how your scar will turn out. Sunscreen with an SPF 30 or greater is recommended once the area is healed.  You should have some soreness but it should be mild and slowly go away over several days. Talk to your doctor about using tylenol for pain,    When should I call my doctor?  If you have increased:   Pain or swelling  Pus or drainage (clear or slightly yellow drainage is ok)  Temperature over 100F  Spreading redness or warmth around wound    When will I hear about my results?  The biopsy results can take 2 weeks to come back.  Your results will automatically release to [employer] before your provider has even reviewed them.  The clinic will call you with the results, send you a [employer] message, or have you schedule a follow-up clinic or phone time to discuss the results.  Contact our clinics if you do not hear from us in 2 weeks.    Who should I call with questions?  [facility]: [phone]  [facility]: [phone]  For urgent needs outside of business hours call the [facility] at [phone] and ask for the dermatology resident on  call

## 2024-04-04 NOTE — PROGRESS NOTES
The following medication was given:     MEDICATION:  Lidocaine with epinephrine 1% [id number]  ROUTE: SQ  SITE: see procedure note  DOSE: 1cc  LOT #: [id number]  : [employer]  EXPIRATION DATE: 03-31-25  NDC#: 08790-729-14  Was there drug waste? no  Multi-dose vial: Yes    Celine Vidal RN  April 4, 2024

## 2024-04-15 LAB
PATH REPORT.COMMENTS IMP SPEC: ABNORMAL
PATH REPORT.COMMENTS IMP SPEC: YES
PATH REPORT.FINAL DX SPEC: ABNORMAL
PATH REPORT.GROSS SPEC: ABNORMAL
PATH REPORT.MICROSCOPIC SPEC OTHER STN: ABNORMAL
PATH REPORT.RELEVANT HX SPEC: ABNORMAL

## 2024-04-17 ENCOUNTER — TELEPHONE (OUTPATIENT)
Dept: DERMATOLOGY | Facility: CLINIC | Age: 29
End: 2024-04-17
Payer: COMMERCIAL

## 2024-04-17 NOTE — TELEPHONE ENCOUNTER
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    Do you take the following medications:  Coumadin, Eliquis, Pradaxa, Xarelto:  NO   -If on Coumadin, INR should be checked within 7 days of surgery.  Range should be 3.5 or less or within therapeutic range.    Past Medical History                                                    Do you currently or have you previously had any of the following conditions:    Hepatitis:  NO  HIV/AIDS:  NO  Prolonged bleeding or bleeding disorder:  NO  Pacemaker or Defibrillator:  NO.    History of artificial or heart valve replacement:  NO  Endocarditis (inflammation of the inner lining of the heart's chambers and valves):  NO  Have you ever had a prosthetic joint infection:  NO  Pregnant or Breastfeeding:  NO  Mobility device (wheelchair, transfer difficulty): NO    Important Reminders:                                                      Ok to take all of their medications as prescribed  Patients can eat, no need to be fasting  Patient will not be able to get the site wet for 48 hrs  No submerging wound in standing water (lake, pool, bathtub, hot tub) for 2 weeks  No physical activity for 48 hrs (further restrictions will be discussed by MD at time of visit)    If any positives, send to RN for further review  Celine Vidal RN     Writer called pt to discuss pathology results. Pt scheduled for 2 excisions. Pt offered sooner appointments: 4/18/24, 4/23/24, and  4/30/24([facility]sc) and she declined. Scheduled for 5/15. Excision checklist complete and all questions were negative. Pt denied having any questions or concerns at this time.     Celine Vidal RN on 4/17/2024 at 9:45 AM      Final Diagnosis  A(1). Skin, left lateral buttock, shave:  - Atypical compound melanocytic proliferation, consistent with melanoma in situ arising in a nevus - (see comment)     B(2). Skin, right tricep, shave:  - Atypical junctional melanocytic proliferation, consistent with early melanoma in situ - (see comment)     Electronically signed by Jarod Becker MD on 4/15/2024 at  5:52 PM        Carin Melendez MD  [facility] [place]  I could not reach patient by phone    Pt has 2 melanomas. Requires excision at both.    Please call and schedule.    Rf

## 2024-05-15 ENCOUNTER — OFFICE VISIT (OUTPATIENT)
Dept: DERMATOLOGY | Facility: CLINIC | Age: 29
End: 2024-05-15
Payer: COMMERCIAL

## 2024-05-15 VITALS — HEART RATE: 75 BPM | SYSTOLIC BLOOD PRESSURE: 127 MMHG | OXYGEN SATURATION: 98 % | DIASTOLIC BLOOD PRESSURE: 87 MMHG

## 2024-05-15 DIAGNOSIS — D03.61 MELANOMA IN SITU OF RIGHT UPPER ARM (H): Primary | ICD-10-CM

## 2024-05-15 DIAGNOSIS — D03.72 MELANOMA IN SITU OF LEFT LOWER EXTREMITY INCLUDING HIP (H): ICD-10-CM

## 2024-05-15 PROCEDURE — 12034 INTMD RPR S/TR/EXT 7.6-12.5: CPT | Performed by: DERMATOLOGY

## 2024-05-15 PROCEDURE — 11602 EXC TR-EXT MAL+MARG 1.1-2 CM: CPT | Performed by: DERMATOLOGY

## 2024-05-15 PROCEDURE — 11603 EXC TR-EXT MAL+MARG 2.1-3 CM: CPT | Performed by: DERMATOLOGY

## 2024-05-15 PROCEDURE — 88305 TISSUE EXAM BY PATHOLOGIST: CPT | Performed by: DERMATOLOGY

## 2024-05-15 ASSESSMENT — PAIN SCALES - GENERAL: PAINLEVEL: NO PAIN (0)

## 2024-05-15 NOTE — PATIENT INSTRUCTIONS
Caring for your skin after surgery    After your surgery, a pressure bandage will be placed over the area. This will prevent bleeding. Please follow these instructions over the next 1 to 2 weeks. Following this regimen will help to prevent complications as your wound heals.     For the first 48 hours after your surgery:    Leave the pressure dressing on and keep it dry. If it should come loose, you may re-tape it, but do not take it off.  Relax and take it easy. Do not do any vigorous exercise, heavy lifting or bending forward. This could cause the wound to bleed.  Post-operative pain is usually mild. You may alternate between 1000 mg of Tylenol (acetaminophen) and 400 mg of Ibuprofen every 4 hours.  Do not take more than 4,000 mg of acetaminophen in a 24-hour period or 3200 mg of Ibuprofen in a 24-hr period.  Avoid alcohol and vitamin E as these may increase your tendency to bleed.  You may put an ice pack around the bandaged area for 20 minutes at a time as needed. This may help reduce swelling, bruising, and pain. Make sure the ice pack is waterproof so that the pressure bandage doesn't get wet.  You may see a small amount of drainage or blood on your pressure bandage. This is normal. However:  If drainage or bleeding continues or saturates the bandage, you will need to apply firm pressure over the bandage with a clean washcloth for 15 minutes.  If bleeding continues after applying pressure for 15 minutes, apply an ice pack with gentle pressure to the bandaged area for another 15 minutes.  If bleeding still continues, call our office or go to the nearest emergency room.    48 Hours After Surgery:  Carefully remove the pressure bandage. If it seems sticky or too difficult to get off, you may need to soak it off in the shower.  Wash wound with a mild soap and water.  Use caution when washing the wound, be gentle and do not let the forceful shower stream hit the wound directly.  Pat dry.  Apply Vaseline (from a new  container or tube) over the suture line with a Q-tip.  Cover the site with a bandage.  Do this daily until the sutures have dissolved.      What to expect:    The first couple of days your wound may be tender and may bleed slightly when doing wound care.  There may be swelling and bruising around the wound, especially if it is near the eyes. For your comfort, you may apply ice or cold compresses to the area.  The area around your wound may be numb for several weeks or even months.  You may experience periodic sharp pain or mild itching around the wound as it heals.   The suture line will look dark pink at first and the edges of the wound will be reddened. This will lighten up each day.    Call Us If:    You have bleeding that will not stop after applying pressure and ice.  You have pain that is not controlled with Tylenol and Ibuprofen.  You have signs or symptoms of an infection such as fever over 100 degrees Fahrenheit, redness, warmth or foul-smelling drainage from the wound    [facility]: [phone]   [facility]: [phone]  For urgent needs outside of business hours call the [facility] at [phone] and ask to speak with the dermatology resident on call

## 2024-05-15 NOTE — NURSING NOTE
The following medication was given:     MEDICATION:  Lidocaine with epinephrine 1% 1:[id number]  ROUTE: SQ  SITE: see procedure note  DOSE: 27 mL  LOT #: [id number]  : [employer]  EXPIRATION DATE: 04-  NDC#: 99028-000-53  Was there drug waste? no  Multi-dose vial: Yes    Vaseline and pressure dressing applied to excision sites on right tricep and left lateral buttock.  Wound care instructions reviewed with patient and AVS provided.  Patient verbalized understanding.  Patient will follow up for suture removal: N/A.  No further questions or concerns at this time.      Chery Gambino CMA  May 15, 2024

## 2024-05-15 NOTE — PROGRESS NOTES
DERMATOLOGY EXCISION PROCEDURE NOTE    Dermatology Problem List:  Last skin check 03/14/24, recommended yearly    1. Hx of MIS  - MIS, L lateral buttock, s/p bx 4/4/24  - MIS, R tricep, s/p bx 4/4/24  2. Consistent with pigmented spindle cell nevus, right upper back -s/p excision 8/11/2015  3. History of dysplastic nevi  - Compound dysplastic nevus with mild atypia - left upper paraspinal back, s/p bx 7/12/22  - Compound nevus with atypical features, right groin s/p bx 6/15/17,  - Compound nevus with moderate dysplastic on the left buttock 6/2017  - Compound dysplastic nevus with moderate to severe atypia, right posterior shoulder, s/p excision 10/6/2016  - Compound dysplastic nevus with moderate to severe atypia, right chest, s/p excision 8/11/2015  4. Hypertrophic scar , right upper back, right chest  - s/p PDL and Core laser treatment   5. Keratosis Pilaris - upper arms   - recommend hydrocortisone or AmLactin   ____________________________________________    Case 1  NAME OF PROCEDURE: Excision intermediate layered linear closure  Staff surgeon: Dr. Chuy Ablright   Student: LA NENA Sim  Scrub Nurse: Chery Gambino CMA    PRE-OPERATIVE DIAGNOSIS:  Melanoma in situ  POST-OPERATIVE DIAGNOSIS: Same   LOCATION: left lateral buttock  FINAL EXCISION SIZE(DEFECT SIZE): 2.0 x 2.1 cm  MARGIN: 0.5 cm  FINAL REPAIR LENGTH: 6.4 cm   ANESTHESIA: 18 mL 1% lidocaine with 1:100,000 epinephrine    INDICATIONS: This patient presented with a 1.0 x 1.1 cm Melanoma in situ. Excision was indicated. We discussed the principles of treatment and most likely complications including scarring, bleeding, infection, incomplete excision, wound dehiscence, pain, nerve damage, and recurrence. Informed consent was obtained and the patient underwent the procedure as follows:    PROCEDURE: The patient was taken to the operative suite. Time-out was performed.  The treatment area was anesthetized with 1% lidocaine with epinephrine. The area was prepped with  Chlorhexidine and rinsed with sterile saline and draped with sterile towels. The lesion was delineated and excised down to subcutaneous fat in a elliptical manner. Hemostasis was obtained by electrocoagulation.     REPAIR: An intermediate layered linear closure was selected as the procedure which would maximally preserve both function and cosmesis.    After the excision of the tumor, the area was carefully undermined. Hemostasis was obtained with bipolar electrocoagulation.  Closure was oriented so that the wound was in the patient's natural skin tension lines. The deep subcutaneous and dermal layers were then closed with 3-0 vicryl  and 4-0 monocryl sutures. The epidermis was then carefully approximated along the length of the wound using 4-0 monocryl running subcuticular sutures.     Estimated blood loss was less than 10 ml for all surgical sites. A sterile pressure dressing was applied and wound care instructions, with a written handout, were given. The [facility] and ambulatory.    The patient elected for pathology results to automatically release and understands that the clinical staff will contact them as soon as possible to notify them of the results.    Dr. Chuy Albright  was immediately available for the entire surgery and was physicially present for the key portions of the procedure.    Anatomic Pathology Results: pending    _________________________________________________________________    Case 2   NAME OF PROCEDURE: Excision intermediate layered linear closure  Staff surgeon: Dr. Chuy Albright   Student: LA NENA Sim  Scrub Nurse: Chery Gambino CMA    PRE-OPERATIVE DIAGNOSIS:  Melanoma in situ  POST-OPERATIVE DIAGNOSIS: Same   LOCATION: right tricep  FINAL EXCISION SIZE(DEFECT SIZE): 1.7 x 1.3 cm  MARGIN: 0.5 cm  FINAL REPAIR LENGTH: 4.4 cm   ANESTHESIA: 9 mL 1% lidocaine with 1:100,000 epinephrine    INDICATIONS: This patient presented with a 0.7 x 0.3 cm  Melanoma in situ. Excision was indicated. We discussed the principles of treatment and most likely complications including scarring, bleeding, infection, incomplete excision, wound dehiscence, pain, nerve damage, and recurrence. Informed consent was obtained and the patient underwent the procedure as follows:    PROCEDURE: The patient was taken to the operative suite. Time-out was performed.  The treatment area was anesthetized with 1% lidocaine with epinephrine. The area was prepped with Chlorhexidine and rinsed with sterile saline and draped with sterile towels. The lesion was delineated and excised down to subcutaneous fat in a elliptical manner. Hemostasis was obtained by electrocoagulation.     REPAIR: An intermediate layered linear closure was selected as the procedure which would maximally preserve both function and cosmesis.    After the excision of the tumor, the area was carefully undermined. Hemostasis was obtained with bipolar electrocoagulation.  Closure was oriented so that the wound was in the patient's natural skin tension lines. The deep subcutaneous and dermal layers were then closed with 3-0 vicryl and 4-0 monocryl sutures. The epidermis was then carefully approximated along the length of the wound using 4-0 monocryl running subcuticular sutures.     Estimated blood loss was less than 10 ml for all surgical sites. A sterile pressure dressing was applied and wound care instructions, with a written handout, were given. The patient was discharged from the [facility] alert and ambulatory.    The patient elected for pathology results to automatically release and understands that the clinical staff will contact them as soon as possible to notify them of the results.    Dr. Chuy Albright  was immediately available for the entire surgery and was physicially present for the key portions of the procedure.    Anatomic Pathology Results: pending    Clinical Follow-Up: 6 month skin check with  Leah Tejeda on 10/7/24    Staff Involved:  Scribe/Staff    Scribe Disclosure:   I, Apoorva Hernandez, am serving as a scribe; to document services personally performed by Dr. Chuy Albright - -based on data collection and the provider's statements to me.     Provider Disclosure:   The documentation recorded by the scribe accurately reflects the services I personally performed and the decisions made by me. I personally performed the procedures today.      Chuy Albright DO    Department of Dermatology  [facility]: Phone: [phone], Fax:[phone]  [facility]: Phone: [phone], Fax: [phone]

## 2024-05-16 ENCOUNTER — TELEPHONE (OUTPATIENT)
Dept: DERMATOLOGY | Facility: CLINIC | Age: 29
End: 2024-05-16
Payer: COMMERCIAL

## 2024-05-16 NOTE — TELEPHONE ENCOUNTER
SUBJECTIVE/OBJECTIVE:                                                    Mayi is 1 day s/p excision of MIS on the left lateral buttock and right tricep.     ASSESSMENT:                                                       NURSING ASSESSMENT:     What are you doing to manage your pain?  Tylenol and Ibuprofen.  Arm is more painful then buttock.  The pain is tolerable.  Is it helping?  Yes  Are you applying ice? no.  I encouraged her to apply an ice pack on/around the surgical sites for 15 min every 2-3 hrs.  Have you had any noticeable bleeding through the bandage?  No, dressings are dry and intact.  Do you have any concerns?  No     PLAN:                                                       Wound care directions reviewed.  Next skin check has been scheduled.     Vi Garzon RN

## 2024-05-20 ENCOUNTER — TELEPHONE (OUTPATIENT)
Dept: DERMATOLOGY | Facility: CLINIC | Age: 29
End: 2024-05-20
Payer: COMMERCIAL

## 2024-05-20 NOTE — TELEPHONE ENCOUNTER
"Pt read [employer] message and will call the clinic with any questions or concerns.   Celine Vidal RN on 5/20/2024 at 8:38 AM      Final Diagnosis  A. Left lateral buttock:  - Scar from the prior surgical procedure, with no evidence of residual lesion - (see description)     B. Right tricep:  - Scar from the prior surgical procedure, with no evidence of residual lesion - (see description)  Electronically signed by Zechariah Nolan MD on 5/17/2024 at  3:44 PM          Mayi,     Please see the following message from Dr. Albright:  \"The pathologist thought we were successful removing the melanoma in situ from her right arm and left buttocks. As long as the wounds are healing well, no additional surgery is necessary.\"     Thank you       Erica Parker on 5/17/2024 at 4:40 PM  Written by Erica Parker on 5/17/2024  4:41 PM CDT  Seen by patient Mayi M Gilberto on 5/19/2024  1:37 PM  "

## 2024-10-10 ENCOUNTER — OFFICE VISIT (OUTPATIENT)
Dept: DERMATOLOGY | Facility: CLINIC | Age: 29
End: 2024-10-10
Attending: DERMATOLOGY
Payer: COMMERCIAL

## 2024-10-10 DIAGNOSIS — L81.4 LENTIGINES: ICD-10-CM

## 2024-10-10 DIAGNOSIS — Z12.83 ENCOUNTER FOR SCREENING FOR MALIGNANT NEOPLASM OF SKIN: Primary | ICD-10-CM

## 2024-10-10 DIAGNOSIS — D22.9 MULTIPLE NEVI: ICD-10-CM

## 2024-10-10 DIAGNOSIS — D18.01 CHERRY ANGIOMA: ICD-10-CM

## 2024-10-10 DIAGNOSIS — L82.1 SEBORRHEIC KERATOSES: ICD-10-CM

## 2024-10-10 DIAGNOSIS — Z85.820 HISTORY OF MALIGNANT MELANOMA OF SKIN: ICD-10-CM

## 2024-10-10 DIAGNOSIS — D49.2 NEOPLASM OF UNSPECIFIED BEHAVIOR OF BONE, SOFT TISSUE, AND SKIN: ICD-10-CM

## 2024-10-10 PROCEDURE — 99213 OFFICE O/P EST LOW 20 MIN: CPT | Mod: 25 | Performed by: PHYSICIAN ASSISTANT

## 2024-10-10 PROCEDURE — 11102 TANGNTL BX SKIN SINGLE LES: CPT | Performed by: PHYSICIAN ASSISTANT

## 2024-10-10 ASSESSMENT — PAIN SCALES - GENERAL: PAINLEVEL: NO PAIN (0)

## 2024-10-10 NOTE — PROGRESS NOTES
[facility] Dermatology Note  Encounter Date: Oct 10, 2024  Office Visit     Reviewed patients past medical history and pertinent chart review prior to patients visit today.     Dermatology Problem List:  # NUB, right lumbar back, shave biopsy 10/10/2024     1. Hx of MIS  - MIS, L lateral buttock, s/p bx 4/4/24  - MIS, R tricep, s/p bx 4/4/24  2. Consistent with pigmented spindle cell nevus, right upper back -s/p excision 8/11/2015  3. History of dysplastic nevi  - Compound dysplastic nevus with mild atypia - left upper paraspinal back, s/p bx 7/12/22  - Compound nevus with atypical features, right groin s/p bx 6/15/17,  - Compound nevus with moderate dysplastic on the left buttock 6/2017  - Compound dysplastic nevus with moderate to severe atypia, right posterior shoulder, s/p excision 10/6/2016  - Compound dysplastic nevus with moderate to severe atypia, right chest, s/p excision 8/11/2015  4. Hypertrophic scar , right upper back, right chest  - s/p PDL and Core laser treatment   5. Keratosis Pilaris - upper arms   - recommend hydrocortisone or AmLactin     Social history: Works as an RN    ____________________________________________    Assessment & Plan:     # Neoplasm of uncertain behavior:  right lumbar back  DDx includes nevus vs atypical nevus vs melanoma. Shave biopsy today.    Procedure Note: Biopsy by shave technique  The risks and benefits of the procedure were described to the patient. These include but are not limited to bleeding, infection, scar, incomplete removal, and non-diagnostic biopsy. Verbal informed consent was obtained. The above site(s) was cleansed with an alcohol pad and injected with 1% lidocaine with epinephrine. Once anesthesia was obtained, a biopsy(ies) was performed with Gilette blade. The tissue(s) was placed in a labeled container(s) with formalin and sent to pathology. Hemostasis was achieved with aluminum chloride. Vaseline and a bandage were applied to the  wound(s). The patient tolerated the procedure well and was given post biopsy care instructions.    # Personal history of malignant melanoma  # Multiple nevi, trunk and extremities  # Solar lentigines  - No signs of recurrence. Continued observation recommended.   - Nevi demonstrate no concerning features on dermoscopy. We discussed the importance of self exams at home.   - ABCDEs: Counseled ABCDEs of melanoma: Asymmetry, Border (irregularity), Color (not uniform, changes in color), Diameter (greater than 6 mm which is about the size of a pencil eraser), and Evolving (any changes in preexisting moles).  - Sun protection: Counseled SPF 30+ sunscreen, UPF clothing, sun avoidance, tanning bed avoidance.    Follow-up:  6 months for follow up full body skin exam, as needed for new or changing lesions or new concerns    All risks, benefits and alternatives were discussed with patient.  Patient is in agreement and understands the assessment and plan.  All questions were answered.  Leah Deshpande PA-C  [facility] Dermatology    ____________________________________________    CC: Skin Check    HPI:  Ms. Mayi Macias is a(n) 29 year old female who presents today as a return patient for a full body skin cancer screening. The patient has a history of malignant melanoma. No cutaneous concerns. The patient reports trying to be diligent with photoprotection.      Physical Exam:  Vitals: There were no vitals taken for this visit.  LYMPH: No cervical or axillary lymphadenopathy.   SKIN: Total skin excluding the genitalia areas was performed. The exam included the scalp, face, neck, bilateral arms, chest, back, abdomen, bilateral legs, digits, mons pubis, buttocks, and nails.   - Aleman II.  - The left lumbar back demonstrates a 1.1 x 0.8 cm brown macule with lateral blue hue on dermoscopy.   - The right buttock and right tricep demonstrates a well healed scar with no nodularity, repigmentation, or pain to palpation.   -  Multiple tan/brown macules and papules scattered throughout exam, consistent with benign nevi. No concerning features on dermoscopy.   - Scattered tan, homogenous macules scattered on sun exposed skin, consistent with solar lentigines.     - No other lesions of concern on areas examined.     Medications:  Current Outpatient Medications   Medication Sig Dispense Refill    levonorgestrel-ethinyl estradiol (SEASONALE) 0.15-0.03 MG tablet Take 1 tablet by mouth daily 61 tablet 5    albuterol (PROAIR HFA) 108 (90 Base) MCG/ACT inhaler Inhale 2 puffs into the lungs every 6 hours (Patient not taking: Reported on 10/10/2024) 1 Inhaler 11    albuterol (PROAIR HFA/PROVENTIL HFA/VENTOLIN HFA) 108 (90 Base) MCG/ACT inhaler Inhale 2 puffs into the lungs every 6 hours (Patient not taking: Reported on 10/10/2024) 1 Inhaler 11     No current facility-administered medications for this visit.      Past Medical History:   Patient Active Problem List   Diagnosis    Contraception    Exercise-induced asthma    Intermittent asthma    Family history of malignant neoplasm of breast    ASCUS with positive high risk HPV cervical    History of dysplastic nevus     Past Medical History:   Diagnosis Date    Abnormal Pap smear of cervix 07/25/2019 07/25/19, 10/09/20    Cervical high risk HPV (human papillomavirus) test positive 07/25/2019 07/25/19, 10/09/20    Exercise-induced asthma 03/04/2011       CC Carin Melendez MD  [address] [facility] [address]  [place] [address] on close of this encounter.

## 2024-10-10 NOTE — PATIENT INSTRUCTIONS
Wound Care After a Biopsy    What is a skin biopsy?  A skin biopsy allows the doctor to examine a very small piece of tissue under the microscope to determine the diagnosis and the best treatment for the skin condition. A local anesthetic (numbing medicine) is injected with a very small needle into the skin area to be tested. A small piece of skin is taken from the area. Sometimes a suture (stitch) is used.     What are the risks of a skin biopsy?  I will experience scar, bleeding, swelling, pain, crusting and redness. I may experience incomplete removal or recurrence. Risks of this procedure are excessive bleeding, bruising, infection, nerve damage, numbness, thick (hypertrophic or keloidal) scar and non-diagnostic biopsy.    How should I care for my wound for the first 24 hours?  Keep the wound dry and covered for 24 hours  If it bleeds, hold direct pressure on the area for 15 minutes. If bleeding does not stop, call us or go to the emergency room  Avoid strenuous exercise the first 1-2 days or as your doctor instructs you    How should I care for the wound after 24 hours?  After 24 hours, remove the bandage  You may bathe or shower as normal  If you had a scalp biopsy, you can shampoo as usual and can use shower water to clean the biopsy site daily  Clean the wound once a day with gentle soap and water  Do not scrub, be gentle  Apply white petroleum/Vaseline after cleaning the wound with a cotton swab or a clean finger, and keep the site covered with a Bandaid /bandage. Bandages are not necessary with a scalp biopsy  If you are unable to cover the site with a Bandaid /bandage, re-apply ointment 2-3 times a day to keep the site moist. Moisture will help with healing  Avoid strenuous activity for first 1-2 days  Avoid lakes, rivers, pools, and oceans until the stitches are removed or the site is healed    How do I clean my wound?  Wash hands thoroughly with soap or use hand  before all wound  care  Clean the wound with gentle soap and water  Apply white petroleum/Vaseline  to wound after it is clean  Replace the Bandaid /bandage to keep the wound covered for the first few days or as instructed by your doctor  If you had a scalp biopsy, warm shower water to the area on a daily basis should suffice    What should I use to clean my wound?   Cotton-tipped applicators (Qtips )  White petroleum jelly (Vaseline or Aquaphor ). Use a clean new container and use Q-tips to apply.  Bandaids  as needed  Gentle soap     How should I care for my wound long term?  Do not get your wound dirty  Keep up with wound care for one week or until the area is healed.  A small scab will form and fall off by itself when the area is completely healed. The area will be red and will become pink in color as it heals. Sun protection is very important for how your scar will turn out. Sunscreen with an SPF 30 or greater is recommended once the area is healed.  You should have some soreness but it should be mild and slowly go away over several days. Talk to your doctor about using tylenol for pain,    When should I call my doctor?  If you have increased:   Pain or swelling  Pus or drainage (clear or slightly yellow drainage is ok)  Temperature over 100F  Spreading redness or warmth around wound    When will I hear about my results?  The biopsy results can take up to 2 weeks to come back.  Your results will automatically release to [employer] before your provider has even reviewed them.  The clinic will call you with the results, send you a [employer] message, or have you schedule a follow-up clinic or phone time to discuss the results.  Contact our clinics if you do not hear from us in 2 weeks.    Who should I call with questions?  [facility]: [phone]  For urgent needs outside of business hours call the [facility] at [phone] and ask for the dermatology resident on call     Proper skin care from  [facility]:    -Eliminate harsh soaps as they strip the natural oils from the skin, often resulting in dry itchy skin ( i.e. Dial, Zest, Kinyarwanda Spring)  -Use mild soaps such as Cetaphil or Dove Sensitive Skin in the shower. You do not need to use soap on arms, legs, and trunk every time you shower unless visibly soiled.   -Avoid hot or cold showers.  -After showering, lightly dry off and apply moisturizing within 2-3 minutes. This will help trap moisture in the skin.   -Aggressive use of a moisturizer at least 1-2 times a day to the entire body (including -Vanicream, Cetaphil, Aquaphor or Cerave) and moisturize hands after every washing.  -We recommend using moisturizers that come in a tub that needs to be scooped out, not a pump. This has more of an oil base. It will hold moisture in your skin much better than a water base moisturizer. The above recommended are non-pore clogging.      Wear a sunscreen with at least SPF 30 on your face, ears, neck and V of the chest daily. Wear sunscreen on other areas of the body if those areas are exposed to the sun throughout the day. Sunscreens can contain physical and/or chemical blockers. Physical blockers are less likely to clog pores, these include zinc oxide and titanium dioxide. Reapply every two hour and after swimming.     Sunscreen examples: https://www.ewg.org/sunscreen/    UV radiation  UVA radiation remains constant throughout the day and throughout the year. It is a longer wavelength than UVB and therefore penetrates deeper into the skin leading to immediate and delayed tanning, photoaging, and skin cancer. 70-80% of UVA and UVB radiation occurs between the hours of 10am-2pm.  UVB radiation  UVB radiation causes the most harmful effects and is more significant during the summer months. However, snow and ice can reflect UVB radiation leading to skin damage during the winter months as well. UVB radiation is responsible for tanning, burning, inflammation,  delayed erythema (pinkness), pigmentation (brown spots), and skin cancer.     I recommend self monthly full body exams and yearly full body exams with a dermatology provider. If you develop a new or changing lesion please follow up for examination. Most skin cancers are pink and scaly or pink and pearly. However, we do see blue/brown/black skin cancers.  Consider the ABCDEs of melanoma when giving yourself your monthly full body exam ( don't forget the groin, buttocks, feet, toes, etc). A-asymmetry, B-borders, C-color, D-diameter, E-elevation or evolving. If you see any of these changes please follow up in clinic. If you cannot see your back I recommend purchasing a hand held mirror to use with a larger wall mirror.       Checking for Skin Cancer  You can find cancer early by checking your skin each month. There are 3 kinds of skin cancer. They are melanoma, basal cell carcinoma, and squamous cell carcinoma. Doing monthly skin checks is the best way to find new marks or skin changes. Follow the instructions below for checking your skin.   The ABCDEs of checking moles for melanoma   Check your moles or growths for signs of melanoma using ABCDE:   Asymmetry: the sides of the mole or growth don t match  Border: the edges are ragged, notched, or blurred  Color: the color within the mole or growth varies  Diameter: the mole or growth is larger than 6 mm (size of a pencil eraser)  Evolving: the size, shape, or color of the mole or growth is changing (evolving is not shown in the images below)    Checking for other types of skin cancer  Basal cell carcinoma or squamous cell carcinoma have symptoms such as:     A spot or mole that looks different from all other marks on your skin  Changes in how an area feels, such as itching, tenderness, or pain  Changes in the skin's surface, such as oozing, bleeding, or scaliness  A sore that does not heal  New swelling or redness beyond the border of a mole    Who s at risk?  Anyone can  get skin cancer. But you are at greater risk if you have:   Fair skin, light-colored hair, or light-colored eyes  Many moles or abnormal moles on your skin  A history of sunburns from sunlight or tanning beds  A family history of skin cancer  A history of exposure to radiation or chemicals  A weakened immune system  If you have had skin cancer in the past, you are at risk for recurring skin cancer.   How to check your skin  Do your monthly skin checkups in front of a full-length mirror. Check all parts of your body, including your:   Head (ears, face, neck, and scalp)  Torso (front, back, and sides)  Arms (tops, undersides, upper, and lower armpits)  Hands (palms, backs, and fingers, including under the nails)  Buttocks and genitals  Legs (front, back, and sides)  Feet (tops, soles, toes, including under the nails, and between toes)  If you have a lot of moles, take digital photos of them each month. Make sure to take photos both up close and from a distance. These can help you see if any moles change over time.   Most skin changes are not cancer. But if you see any changes in your skin, call your doctor right away. Only he or she can diagnose a problem. If you have skin cancer, seeing your doctor can be the first step toward getting the treatment that could save your life.   [employer] last reviewed this educational content on 4/1/2019 2000-2020 [employer]. [address] [place] [address]. All rights reserved. This information is not intended as a substitute for professional medical care. Always follow your healthcare professional's instructions.       When should I call my doctor?  If you are worsening or not improving, please, contact us or seek urgent care as noted below.     Who should I call with questions (adults)?    [facility] [phone]  For urgent needs outside of business hours call the [facility] at [phone] and ask for the  dermatology resident on call to be paged  If this is a medical emergency and you are unable to reach an ER, Call 911      If you need a prescription refill, please contact your pharmacy. Refills are approved or denied by our Physicians during normal business hours, Monday through Fridays  Per office policy, refills will not be granted if you have not been seen within the past year (or sooner depending on your child's condition)

## 2024-10-10 NOTE — LETTER
10/10/2024      Mayi Macias  [address] [facility] [address]      Dear Colleague,    Thank you for referring your patient, Mayi Macias, to the [facility]. Please see a copy of my visit note below.    [facility] Dermatology Note  Encounter Date: Oct 10, 2024  Office Visit     Reviewed patients past medical history and pertinent chart review prior to patients visit today.     Dermatology Problem List:  # NUB, right lumbar back, shave biopsy 10/10/2024     1. Hx of MIS  - MIS, L lateral buttock, s/p bx 4/4/24  - MIS, R tricep, s/p bx 4/4/24  2. Consistent with pigmented spindle cell nevus, right upper back -s/p excision 8/11/2015  3. History of dysplastic nevi  - Compound dysplastic nevus with mild atypia - left upper paraspinal back, s/p bx 7/12/22  - Compound nevus with atypical features, right groin s/p bx 6/15/17,  - Compound nevus with moderate dysplastic on the left buttock 6/2017  - Compound dysplastic nevus with moderate to severe atypia, right posterior shoulder, s/p excision 10/6/2016  - Compound dysplastic nevus with moderate to severe atypia, right chest, s/p excision 8/11/2015  4. Hypertrophic scar , right upper back, right chest  - s/p PDL and Core laser treatment   5. Keratosis Pilaris - upper arms   - recommend hydrocortisone or AmLactin     Social history: Works as an RN    ____________________________________________    Assessment & Plan:     # Neoplasm of uncertain behavior:  right lumbar back  DDx includes nevus vs atypical nevus vs melanoma. Shave biopsy today.    Procedure Note: Biopsy by shave technique  The risks and benefits of the procedure were described to the patient. These include but are not limited to bleeding, infection, scar, incomplete removal, and non-diagnostic biopsy. Verbal informed consent was obtained. The above site(s) was cleansed with an alcohol pad and injected with 1% lidocaine with epinephrine. Once anesthesia  was obtained, a biopsy(ies) was performed with Gilette blade. The tissue(s) was placed in a labeled container(s) with formalin and sent to pathology. Hemostasis was achieved with aluminum chloride. Vaseline and a bandage were applied to the wound(s). The patient tolerated the procedure well and was given post biopsy care instructions.    # Personal history of malignant melanoma  # Multiple nevi, trunk and extremities  # Solar lentigines  - No signs of recurrence. Continued observation recommended.   - Nevi demonstrate no concerning features on dermoscopy. We discussed the importance of self exams at home.   - ABCDEs: Counseled ABCDEs of melanoma: Asymmetry, Border (irregularity), Color (not uniform, changes in color), Diameter (greater than 6 mm which is about the size of a pencil eraser), and Evolving (any changes in preexisting moles).  - Sun protection: Counseled SPF 30+ sunscreen, UPF clothing, sun avoidance, tanning bed avoidance.    Follow-up:  6 months for follow up full body skin exam, as needed for new or changing lesions or new concerns    All risks, benefits and alternatives were discussed with patient.  Patient is in agreement and understands the assessment and plan.  All questions were answered.  Leah Deshpande PA-C  [facility] Dermat[facility]    ____________________________________________    CC: Skin Check    HPI:  Ms. Mayi Macias is a(n) 29 year old female who presents today as a return patient for a full body skin cancer screening. The patient has a history of malignant melanoma. No cutaneous concerns. The patient reports trying to be diligent with photoprotection.      Physical Exam:  Vitals: There were no vitals taken for this visit.  LYMPH: No cervical or axillary lymphadenopathy.   SKIN: Total skin excluding the genitalia areas was performed. The exam included the scalp, face, neck, bilateral arms, chest, back, abdomen, bilateral legs, digits, mons pubis, buttocks, and nails.   -  Aleman II.  - The left lumbar back demonstrates a 1.1 x 0.8 cm brown macule with lateral blue hue on dermoscopy.   - The right buttock and right tricep demonstrates a well healed scar with no nodularity, repigmentation, or pain to palpation.   - Multiple tan/brown macules and papules scattered throughout exam, consistent with benign nevi. No concerning features on dermoscopy.   - Scattered tan, homogenous macules scattered on sun exposed skin, consistent with solar lentigines.     - No other lesions of concern on areas examined.     Medications:  Current Outpatient Medications   Medication Sig Dispense Refill     levonorgestrel-ethinyl estradiol (SEASONALE) 0.15-0.03 MG tablet Take 1 tablet by mouth daily 61 tablet 5     albuterol (PROAIR HFA) 108 (90 Base) MCG/ACT inhaler Inhale 2 puffs into the lungs every 6 hours (Patient not taking: Reported on 10/10/2024) 1 Inhaler 11     albuterol (PROAIR HFA/PROVENTIL HFA/VENTOLIN HFA) 108 (90 Base) MCG/ACT inhaler Inhale 2 puffs into the lungs every 6 hours (Patient not taking: Reported on 10/10/2024) 1 Inhaler 11     No current facility-administered medications for this visit.      Past Medical History:   Patient Active Problem List   Diagnosis     Contraception     Exercise-induced asthma     Intermittent asthma     Family history of malignant neoplasm of breast     ASCUS with positive high risk HPV cervical     History of dysplastic nevus     Past Medical History:   Diagnosis Date     Abnormal Pap smear of cervix 07/25/2019 07/25/19, 10/09/20     Cervical high risk HPV (human papillomavirus) test positive 07/25/2019 07/25/19, 10/09/20     Exercise-induced asthma 03/04/2011       CC Carin Melendez MD  [address] [facility] [address]  [place] [address] on close of this encounter.      Again, thank you for allowing me to participate in the care of your patient.        Sincerely,        Leah Deshpande PA-C

## 2024-10-15 LAB
PATH REPORT.COMMENTS IMP SPEC: NORMAL
PATH REPORT.FINAL DX SPEC: NORMAL
PATH REPORT.GROSS SPEC: NORMAL
PATH REPORT.MICROSCOPIC SPEC OTHER STN: NORMAL
PATH REPORT.RELEVANT HX SPEC: NORMAL

## 2025-01-04 ENCOUNTER — HEALTH MAINTENANCE LETTER (OUTPATIENT)
Age: 30
End: 2025-01-04